# Patient Record
Sex: MALE | Race: WHITE | URBAN - METROPOLITAN AREA
[De-identification: names, ages, dates, MRNs, and addresses within clinical notes are randomized per-mention and may not be internally consistent; named-entity substitution may affect disease eponyms.]

---

## 2021-06-15 ENCOUNTER — OFFICE VISIT (OUTPATIENT)
Dept: OTOLARYNGOLOGY | Facility: CLINIC | Age: 26
End: 2021-06-15
Payer: COMMERCIAL

## 2021-06-15 VITALS
TEMPERATURE: 98 F | WEIGHT: 164 LBS | HEIGHT: 71 IN | SYSTOLIC BLOOD PRESSURE: 126 MMHG | BODY MASS INDEX: 22.96 KG/M2 | HEART RATE: 85 BPM | DIASTOLIC BLOOD PRESSURE: 74 MMHG

## 2021-06-15 DIAGNOSIS — H60.62 CHRONIC OTITIS EXTERNA OF LEFT EAR, UNSPECIFIED TYPE: Primary | ICD-10-CM

## 2021-06-15 PROCEDURE — 87076 CULTURE ANAEROBE IDENT EACH: CPT | Performed by: OTOLARYNGOLOGY

## 2021-06-15 PROCEDURE — 99203 OFFICE O/P NEW LOW 30 MIN: CPT | Performed by: OTOLARYNGOLOGY

## 2021-06-15 PROCEDURE — 87186 SC STD MICRODIL/AGAR DIL: CPT | Performed by: OTOLARYNGOLOGY

## 2021-06-15 PROCEDURE — 87070 CULTURE OTHR SPECIMN AEROBIC: CPT | Performed by: OTOLARYNGOLOGY

## 2021-06-15 PROCEDURE — 3008F BODY MASS INDEX DOCD: CPT | Performed by: OTOLARYNGOLOGY

## 2021-06-15 PROCEDURE — 3074F SYST BP LT 130 MM HG: CPT | Performed by: OTOLARYNGOLOGY

## 2021-06-15 PROCEDURE — 3078F DIAST BP <80 MM HG: CPT | Performed by: OTOLARYNGOLOGY

## 2021-06-15 PROCEDURE — 87077 CULTURE AEROBIC IDENTIFY: CPT | Performed by: OTOLARYNGOLOGY

## 2021-06-15 PROCEDURE — 92504 EAR MICROSCOPY EXAMINATION: CPT | Performed by: OTOLARYNGOLOGY

## 2021-06-15 PROCEDURE — 87205 SMEAR GRAM STAIN: CPT | Performed by: OTOLARYNGOLOGY

## 2021-06-15 RX ORDER — LISDEXAMFETAMINE DIMESYLATE 60 MG/1
CAPSULE ORAL
COMMUNITY
Start: 2020-12-23

## 2021-06-15 RX ORDER — CIPROFLOXACIN 500 MG/1
TABLET, FILM COATED ORAL
COMMUNITY
Start: 2021-03-09

## 2021-06-15 RX ORDER — IBUPROFEN 600 MG/1
600 TABLET ORAL
COMMUNITY
Start: 2019-09-20

## 2021-06-15 RX ORDER — CYCLOBENZAPRINE HCL 10 MG
10 TABLET ORAL
COMMUNITY
Start: 2019-09-20

## 2021-06-15 NOTE — PROGRESS NOTES
Malena Vaz is a 27year old male. Patient presents with:  Ear Problem: Dryness and flaky of ears. HPI:   For several years has had problems with irritation in his left ear. Is been flaky and scaly at times.   Sometimes there is discomfort and mild pa Detail Normal Submental. Submandibular. Anterior cervical. Posterior cervical. Supraclavicular.    Eyes Normal Conjunctiva - Right: Normal, Left: Normal. Pupil - Right: Normal, Left: Normal.    Ears Normal Inspection - Right: Normal, Left: Normal. Canal - L

## 2021-06-16 ENCOUNTER — TELEPHONE (OUTPATIENT)
Dept: OTOLARYNGOLOGY | Facility: CLINIC | Age: 26
End: 2021-06-16

## 2021-06-16 DIAGNOSIS — H60.62 CHRONIC OTITIS EXTERNA OF LEFT EAR, UNSPECIFIED TYPE: Primary | ICD-10-CM

## 2021-06-19 ENCOUNTER — TELEPHONE (OUTPATIENT)
Dept: OTOLARYNGOLOGY | Facility: CLINIC | Age: 26
End: 2021-06-19

## 2021-06-19 RX ORDER — CIPROFLOXACIN 500 MG/1
500 TABLET, FILM COATED ORAL EVERY 12 HOURS
Qty: 20 TABLET | Refills: 0 | Status: SHIPPED | OUTPATIENT
Start: 2021-06-19 | End: 2021-06-29

## 2021-11-21 ENCOUNTER — LAB SERVICES (OUTPATIENT)
Dept: LAB | Age: 26
End: 2021-11-21

## 2021-11-21 DIAGNOSIS — Z01.812 PRE-PROCEDURAL LABORATORY EXAMINATION: Primary | ICD-10-CM

## 2021-11-21 PROCEDURE — U0003 INFECTIOUS AGENT DETECTION BY NUCLEIC ACID (DNA OR RNA); SEVERE ACUTE RESPIRATORY SYNDROME CORONAVIRUS 2 (SARS-COV-2) (CORONAVIRUS DISEASE [COVID-19]), AMPLIFIED PROBE TECHNIQUE, MAKING USE OF HIGH THROUGHPUT TECHNOLOGIES AS DESCRIBED BY CMS-2020-01-R: HCPCS | Performed by: PSYCHIATRY & NEUROLOGY

## 2021-11-21 PROCEDURE — U0005 INFEC AGEN DETEC AMPLI PROBE: HCPCS | Performed by: PSYCHIATRY & NEUROLOGY

## 2021-11-22 LAB
SARS-COV-2 RNA RESP QL NAA+PROBE: NOT DETECTED
SERVICE CMNT-IMP: NORMAL
SERVICE CMNT-IMP: NORMAL

## 2021-11-22 RX ORDER — UBIDECARENONE 75 MG
50 CAPSULE ORAL DAILY
COMMUNITY

## 2021-11-22 ASSESSMENT — COGNITIVE AND FUNCTIONAL STATUS - GENERAL
ARE YOU BLIND OR DO YOU HAVE SERIOUS DIFFICULTY SEEING, EVEN WHEN WEARING GLASSES: NO
ARE YOU DEAF OR DO YOU HAVE SERIOUS DIFFICULTY  HEARING: NO

## 2021-11-22 ASSESSMENT — ACTIVITIES OF DAILY LIVING (ADL)
ADL_BEFORE_ADMISSION: INDEPENDENT
NEEDS_ASSIST: NO
HISTORY OF FALLING IN THE LAST YEAR (PRIOR TO ADMISSION): NO
ADL_SCORE: 12
ADL_SHORT_OF_BREATH: NO

## 2021-11-23 ENCOUNTER — ANESTHESIA EVENT (OUTPATIENT)
Dept: SURGERY | Age: 26
End: 2021-11-23

## 2021-11-23 ENCOUNTER — HOSPITAL ENCOUNTER (OUTPATIENT)
Age: 26
Discharge: HOME | End: 2021-11-23
Attending: ORTHOPAEDIC SURGERY | Admitting: ORTHOPAEDIC SURGERY

## 2021-11-23 ENCOUNTER — ANESTHESIA (OUTPATIENT)
Dept: SURGERY | Age: 26
End: 2021-11-23

## 2021-11-23 PROCEDURE — 10006027 HB SUPPLY 278: Performed by: ORTHOPAEDIC SURGERY

## 2021-11-23 PROCEDURE — 13000117 HB ORTHO COMPLEX CASE EA ADD MINUTE: Performed by: ORTHOPAEDIC SURGERY

## 2021-11-23 PROCEDURE — 13000001 HB PHASE II RECOVERY EA 30 MINUTES: Performed by: ORTHOPAEDIC SURGERY

## 2021-11-23 PROCEDURE — 10002801 HB RX 250 W/O HCPCS: Performed by: ORTHOPAEDIC SURGERY

## 2021-11-23 PROCEDURE — 10006023 HB SUPPLY 272: Performed by: ORTHOPAEDIC SURGERY

## 2021-11-23 PROCEDURE — 10002801 HB RX 250 W/O HCPCS: Performed by: ANESTHESIOLOGY

## 2021-11-23 PROCEDURE — C1713 ANCHOR/SCREW BN/BN,TIS/BN: HCPCS | Performed by: ORTHOPAEDIC SURGERY

## 2021-11-23 PROCEDURE — 10002800 HB RX 250 W HCPCS: Performed by: ORTHOPAEDIC SURGERY

## 2021-11-23 PROCEDURE — 10004452 HB PACU ADDL 30 MINUTES: Performed by: ORTHOPAEDIC SURGERY

## 2021-11-23 PROCEDURE — 13000002 HB ANESTHESIA  GENERAL  S/U + 1ST 15 MIN: Performed by: ORTHOPAEDIC SURGERY

## 2021-11-23 PROCEDURE — 10002800 HB RX 250 W HCPCS: Performed by: ANESTHESIOLOGY

## 2021-11-23 PROCEDURE — 13000116 HB ORTHO COMPLEX CASE S/U + 1ST 15 MIN: Performed by: ORTHOPAEDIC SURGERY

## 2021-11-23 PROCEDURE — 10004451 HB PACU RECOVERY 1ST 30 MINUTES: Performed by: ORTHOPAEDIC SURGERY

## 2021-11-23 PROCEDURE — 10002800 HB RX 250 W HCPCS: Performed by: PHYSICIAN ASSISTANT

## 2021-11-23 PROCEDURE — 10002807 HB RX 258: Performed by: ANESTHESIOLOGY

## 2021-11-23 PROCEDURE — 13000003 HB ANESTHESIA  GENERAL EA ADD MINUTE: Performed by: ORTHOPAEDIC SURGERY

## 2021-11-23 DEVICE — SUTR ANCH,BIO-COMP S-TAK
Type: IMPLANTABLE DEVICE | Site: SHOULDER | Status: FUNCTIONAL
Brand: ARTHREX®

## 2021-11-23 RX ORDER — 0.9 % SODIUM CHLORIDE 0.9 %
2 VIAL (ML) INJECTION EVERY 12 HOURS SCHEDULED
Status: DISCONTINUED | OUTPATIENT
Start: 2021-11-23 | End: 2021-11-23 | Stop reason: HOSPADM

## 2021-11-23 RX ORDER — ONDANSETRON 2 MG/ML
4 INJECTION INTRAMUSCULAR; INTRAVENOUS 2 TIMES DAILY PRN
Status: DISCONTINUED | OUTPATIENT
Start: 2021-11-23 | End: 2021-11-23 | Stop reason: HOSPADM

## 2021-11-23 RX ORDER — BUPIVACAINE HYDROCHLORIDE AND EPINEPHRINE 5; 5 MG/ML; UG/ML
INJECTION, SOLUTION EPIDURAL; INTRACAUDAL; PERINEURAL PRN
Status: DISCONTINUED | OUTPATIENT
Start: 2021-11-23 | End: 2021-11-23 | Stop reason: HOSPADM

## 2021-11-23 RX ORDER — MIDAZOLAM HYDROCHLORIDE 1 MG/ML
2 INJECTION, SOLUTION INTRAMUSCULAR; INTRAVENOUS
Status: COMPLETED | OUTPATIENT
Start: 2021-11-23 | End: 2021-11-23

## 2021-11-23 RX ORDER — DEXTROSE MONOHYDRATE 25 G/50ML
25 INJECTION, SOLUTION INTRAVENOUS PRN
Status: DISCONTINUED | OUTPATIENT
Start: 2021-11-23 | End: 2021-11-23 | Stop reason: HOSPADM

## 2021-11-23 RX ORDER — LIDOCAINE HYDROCHLORIDE 10 MG/ML
5-10 INJECTION, SOLUTION INFILTRATION; PERINEURAL PRN
Status: DISCONTINUED | OUTPATIENT
Start: 2021-11-23 | End: 2021-11-23 | Stop reason: HOSPADM

## 2021-11-23 RX ORDER — ONDANSETRON 2 MG/ML
INJECTION INTRAMUSCULAR; INTRAVENOUS PRN
Status: DISCONTINUED | OUTPATIENT
Start: 2021-11-23 | End: 2021-11-23

## 2021-11-23 RX ORDER — DEXAMETHASONE SODIUM PHOSPHATE 4 MG/ML
INJECTION, SOLUTION INTRA-ARTICULAR; INTRALESIONAL; INTRAMUSCULAR; INTRAVENOUS; SOFT TISSUE PRN
Status: DISCONTINUED | OUTPATIENT
Start: 2021-11-23 | End: 2021-11-23

## 2021-11-23 RX ORDER — NICOTINE POLACRILEX 4 MG
30 LOZENGE BUCCAL
Status: DISCONTINUED | OUTPATIENT
Start: 2021-11-23 | End: 2021-11-23 | Stop reason: HOSPADM

## 2021-11-23 RX ORDER — SODIUM CHLORIDE 9 MG/ML
INJECTION, SOLUTION INTRAVENOUS CONTINUOUS
Status: DISCONTINUED | OUTPATIENT
Start: 2021-11-23 | End: 2021-11-23 | Stop reason: HOSPADM

## 2021-11-23 RX ORDER — MAGNESIUM HYDROXIDE 1200 MG/15ML
LIQUID ORAL PRN
Status: DISCONTINUED | OUTPATIENT
Start: 2021-11-23 | End: 2021-11-23 | Stop reason: HOSPADM

## 2021-11-23 RX ORDER — LIDOCAINE HYDROCHLORIDE 10 MG/ML
INJECTION, SOLUTION INFILTRATION; PERINEURAL PRN
Status: DISCONTINUED | OUTPATIENT
Start: 2021-11-23 | End: 2021-11-23

## 2021-11-23 RX ORDER — GLYCOPYRROLATE 0.2 MG/ML
INJECTION, SOLUTION INTRAMUSCULAR; INTRAVENOUS PRN
Status: DISCONTINUED | OUTPATIENT
Start: 2021-11-23 | End: 2021-11-23

## 2021-11-23 RX ORDER — DEXTROSE MONOHYDRATE 50 MG/ML
INJECTION, SOLUTION INTRAVENOUS CONTINUOUS PRN
Status: DISCONTINUED | OUTPATIENT
Start: 2021-11-23 | End: 2021-11-23 | Stop reason: HOSPADM

## 2021-11-23 RX ORDER — HYDROCODONE BITARTRATE AND ACETAMINOPHEN 5; 325 MG/1; MG/1
2 TABLET ORAL EVERY 4 HOURS PRN
Status: DISCONTINUED | OUTPATIENT
Start: 2021-11-23 | End: 2021-11-23 | Stop reason: HOSPADM

## 2021-11-23 RX ORDER — HUMAN INSULIN 100 [IU]/ML
INJECTION, SOLUTION SUBCUTANEOUS
Status: DISCONTINUED | OUTPATIENT
Start: 2021-11-23 | End: 2021-11-23 | Stop reason: HOSPADM

## 2021-11-23 RX ORDER — SODIUM CHLORIDE, SODIUM LACTATE, POTASSIUM CHLORIDE, CALCIUM CHLORIDE 600; 310; 30; 20 MG/100ML; MG/100ML; MG/100ML; MG/100ML
INJECTION, SOLUTION INTRAVENOUS CONTINUOUS PRN
Status: DISCONTINUED | OUTPATIENT
Start: 2021-11-23 | End: 2021-11-23

## 2021-11-23 RX ORDER — NEOSTIGMINE METHYLSULFATE 4 MG/4 ML
SYRINGE (ML) INTRAVENOUS PRN
Status: DISCONTINUED | OUTPATIENT
Start: 2021-11-23 | End: 2021-11-23

## 2021-11-23 RX ORDER — ROCURONIUM BROMIDE 10 MG/ML
INJECTION, SOLUTION INTRAVENOUS PRN
Status: DISCONTINUED | OUTPATIENT
Start: 2021-11-23 | End: 2021-11-23

## 2021-11-23 RX ORDER — SODIUM CHLORIDE, SODIUM LACTATE, POTASSIUM CHLORIDE, CALCIUM CHLORIDE 600; 310; 30; 20 MG/100ML; MG/100ML; MG/100ML; MG/100ML
INJECTION, SOLUTION INTRAVENOUS CONTINUOUS
Status: DISCONTINUED | OUTPATIENT
Start: 2021-11-23 | End: 2021-11-23 | Stop reason: HOSPADM

## 2021-11-23 RX ORDER — PROPOFOL 10 MG/ML
INJECTION, EMULSION INTRAVENOUS PRN
Status: DISCONTINUED | OUTPATIENT
Start: 2021-11-23 | End: 2021-11-23

## 2021-11-23 RX ADMIN — LIDOCAINE HYDROCHLORIDE 50 MG: 10 INJECTION, SOLUTION INFILTRATION; PERINEURAL at 15:04

## 2021-11-23 RX ADMIN — ONDANSETRON 4 MG: 2 INJECTION INTRAMUSCULAR; INTRAVENOUS at 15:41

## 2021-11-23 RX ADMIN — Medication 15 MG: at 15:41

## 2021-11-23 RX ADMIN — DEXAMETHASONE SODIUM PHOSPHATE 8 MG: 4 INJECTION, SOLUTION INTRAMUSCULAR; INTRAVENOUS at 15:15

## 2021-11-23 RX ADMIN — GLYCOPYRROLATE 0.8 MG: 0.2 INJECTION, SOLUTION INTRAMUSCULAR; INTRAVENOUS at 16:14

## 2021-11-23 RX ADMIN — Medication 4 MG: at 16:14

## 2021-11-23 RX ADMIN — SODIUM CHLORIDE, POTASSIUM CHLORIDE, SODIUM LACTATE AND CALCIUM CHLORIDE: 600; 310; 30; 20 INJECTION, SOLUTION INTRAVENOUS at 14:56

## 2021-11-23 RX ADMIN — CEFAZOLIN SODIUM 2000 MG: 300 INJECTION, POWDER, LYOPHILIZED, FOR SOLUTION INTRAVENOUS at 15:11

## 2021-11-23 RX ADMIN — FENTANYL CITRATE 100 MCG: 50 INJECTION, SOLUTION INTRAMUSCULAR; INTRAVENOUS at 15:03

## 2021-11-23 RX ADMIN — PROPOFOL 25 MCG/KG/MIN: 10 INJECTION, EMULSION INTRAVENOUS at 15:34

## 2021-11-23 RX ADMIN — ROCURONIUM BROMIDE 40 MG: 10 INJECTION INTRAVENOUS at 15:04

## 2021-11-23 RX ADMIN — PROPOFOL 200 MG: 10 INJECTION, EMULSION INTRAVENOUS at 15:04

## 2021-11-23 RX ADMIN — MIDAZOLAM 2 MG: 1 INJECTION INTRAMUSCULAR; INTRAVENOUS at 14:52

## 2021-11-23 ASSESSMENT — PAIN SCALES - GENERAL
PAINLEVEL_OUTOF10: 6
PAINLEVEL_OUTOF10: 0
PAINLEVEL_OUTOF10: 6
PAINLEVEL_OUTOF10: 5
PAINLEVEL_OUTOF10: 0

## 2021-11-24 VITALS
WEIGHT: 169.09 LBS | HEIGHT: 71 IN | TEMPERATURE: 96.8 F | RESPIRATION RATE: 18 BRPM | OXYGEN SATURATION: 99 % | HEART RATE: 87 BPM | BODY MASS INDEX: 23.67 KG/M2 | SYSTOLIC BLOOD PRESSURE: 140 MMHG | DIASTOLIC BLOOD PRESSURE: 70 MMHG

## 2022-06-11 ENCOUNTER — HOSPITAL ENCOUNTER (OUTPATIENT)
Age: 27
Discharge: HOME | End: 2022-06-11
Payer: COMMERCIAL

## 2022-06-11 LAB — SARS-COV-2 RNA RESP QL NAA+PROBE: DETECTED

## 2022-06-12 ENCOUNTER — HOSPITAL ENCOUNTER (EMERGENCY)
Facility: HOSPITAL | Age: 27
Discharge: HOME | End: 2022-06-12
Attending: STUDENT IN AN ORGANIZED HEALTH CARE EDUCATION/TRAINING PROGRAM
Payer: COMMERCIAL

## 2022-06-12 ENCOUNTER — APPOINTMENT (OUTPATIENT)
Dept: CT IMAGING | Facility: HOSPITAL | Age: 27
End: 2022-06-12
Attending: STUDENT IN AN ORGANIZED HEALTH CARE EDUCATION/TRAINING PROGRAM
Payer: COMMERCIAL

## 2022-06-12 VITALS
RESPIRATION RATE: 18 BRPM | TEMPERATURE: 98 F | DIASTOLIC BLOOD PRESSURE: 54 MMHG | HEIGHT: 70 IN | BODY MASS INDEX: 24.91 KG/M2 | SYSTOLIC BLOOD PRESSURE: 136 MMHG | HEART RATE: 73 BPM | WEIGHT: 174 LBS | OXYGEN SATURATION: 97 %

## 2022-06-12 DIAGNOSIS — K52.9 ACUTE COLITIS: Primary | ICD-10-CM

## 2022-06-12 LAB
ANION GAP SERPL CALC-SCNC: 5 MMOL/L (ref 0–18)
BASOPHILS # BLD AUTO: 0.04 X10(3) UL (ref 0–0.2)
BASOPHILS NFR BLD AUTO: 0.6 %
BUN BLD-MCNC: 9 MG/DL (ref 7–18)
BUN/CREAT SERPL: 9.3 (ref 10–20)
CALCIUM BLD-MCNC: 9.2 MG/DL (ref 8.5–10.1)
CHLORIDE SERPL-SCNC: 106 MMOL/L (ref 98–112)
CO2 SERPL-SCNC: 30 MMOL/L (ref 21–32)
CREAT BLD-MCNC: 0.97 MG/DL
CRP SERPL-MCNC: <0.29 MG/DL (ref ?–0.3)
DEPRECATED RDW RBC AUTO: 46.1 FL (ref 35.1–46.3)
EOSINOPHIL # BLD AUTO: 0.04 X10(3) UL (ref 0–0.7)
EOSINOPHIL NFR BLD AUTO: 0.6 %
ERYTHROCYTE [DISTWIDTH] IN BLOOD BY AUTOMATED COUNT: 13.2 % (ref 11–15)
ERYTHROCYTE [SEDIMENTATION RATE] IN BLOOD: 3 MM/HR
GLUCOSE BLD-MCNC: 89 MG/DL (ref 70–99)
HCT VFR BLD AUTO: 49.1 %
HGB BLD-MCNC: 16.2 G/DL
IMM GRANULOCYTES # BLD AUTO: 0.01 X10(3) UL (ref 0–1)
IMM GRANULOCYTES NFR BLD: 0.2 %
LYMPHOCYTES # BLD AUTO: 1.41 X10(3) UL (ref 1–4)
LYMPHOCYTES NFR BLD AUTO: 22.3 %
MCH RBC QN AUTO: 31 PG (ref 26–34)
MCHC RBC AUTO-ENTMCNC: 33 G/DL (ref 31–37)
MCV RBC AUTO: 94.1 FL
MONOCYTES # BLD AUTO: 0.91 X10(3) UL (ref 0.1–1)
MONOCYTES NFR BLD AUTO: 14.4 %
NEUTROPHILS # BLD AUTO: 3.92 X10 (3) UL (ref 1.5–7.7)
NEUTROPHILS # BLD AUTO: 3.92 X10(3) UL (ref 1.5–7.7)
NEUTROPHILS NFR BLD AUTO: 61.9 %
OSMOLALITY SERPL CALC.SUM OF ELEC: 290 MOSM/KG (ref 275–295)
PLATELET # BLD AUTO: 311 10(3)UL (ref 150–450)
POTASSIUM SERPL-SCNC: 4.3 MMOL/L (ref 3.5–5.1)
RBC # BLD AUTO: 5.22 X10(6)UL
SODIUM SERPL-SCNC: 141 MMOL/L (ref 136–145)
WBC # BLD AUTO: 6.3 X10(3) UL (ref 4–11)

## 2022-06-12 PROCEDURE — 36415 COLL VENOUS BLD VENIPUNCTURE: CPT

## 2022-06-12 PROCEDURE — 74177 CT ABD & PELVIS W/CONTRAST: CPT | Performed by: STUDENT IN AN ORGANIZED HEALTH CARE EDUCATION/TRAINING PROGRAM

## 2022-06-12 PROCEDURE — 85025 COMPLETE CBC W/AUTO DIFF WBC: CPT | Performed by: STUDENT IN AN ORGANIZED HEALTH CARE EDUCATION/TRAINING PROGRAM

## 2022-06-12 PROCEDURE — 85652 RBC SED RATE AUTOMATED: CPT | Performed by: STUDENT IN AN ORGANIZED HEALTH CARE EDUCATION/TRAINING PROGRAM

## 2022-06-12 PROCEDURE — 80048 BASIC METABOLIC PNL TOTAL CA: CPT | Performed by: STUDENT IN AN ORGANIZED HEALTH CARE EDUCATION/TRAINING PROGRAM

## 2022-06-12 PROCEDURE — 86140 C-REACTIVE PROTEIN: CPT | Performed by: STUDENT IN AN ORGANIZED HEALTH CARE EDUCATION/TRAINING PROGRAM

## 2022-06-12 PROCEDURE — 99284 EMERGENCY DEPT VISIT MOD MDM: CPT

## 2022-06-12 RX ORDER — AMOXICILLIN AND CLAVULANATE POTASSIUM 875; 125 MG/1; MG/1
1 TABLET, FILM COATED ORAL 2 TIMES DAILY
Qty: 14 TABLET | Refills: 0 | Status: SHIPPED | OUTPATIENT
Start: 2022-06-12 | End: 2022-06-19

## 2022-06-13 ENCOUNTER — TELEPHONE (OUTPATIENT)
Dept: GASTROENTEROLOGY | Facility: CLINIC | Age: 27
End: 2022-06-13

## 2022-06-13 ENCOUNTER — TELEPHONE (OUTPATIENT)
Dept: INTERNAL MEDICINE CLINIC | Facility: CLINIC | Age: 27
End: 2022-06-13

## 2022-06-13 NOTE — TELEPHONE ENCOUNTER
Pt's mother, Maribel Trotter, called with concerns after ER visit and GI follow up/testing that is needed   - Advised lower GI cannot be scheduled until Sept/or Oct     Pt was seen in the ER yesterday for bleeding after bowel movements   Pt had a CT scan, abdomen was irritated, could be colitis   He is also Covid positive     - new pt ER follow up scheduled with Dr Atilio Garcia on June 21       Patient can be reached at [phone]

## 2022-06-14 NOTE — TELEPHONE ENCOUNTER
pts mother is calling to f/up on sched an appt for the pt to be seen for ER F/up. Mom states that the pt is with her.

## 2022-06-14 NOTE — TELEPHONE ENCOUNTER
I spoke to the pt's mother. We scheduled a f/u appt for today.     Date time and OP location verified    Your Appointments    Tuesday June 14, 2022  1:30 PM  Consult with Alpesh Gan MD  [facility], [facility], [facility], [facility] ([address]) [address]  [phone]

## 2022-06-21 ENCOUNTER — OFFICE VISIT (OUTPATIENT)
Dept: INTERNAL MEDICINE CLINIC | Facility: CLINIC | Age: 27
End: 2022-06-21
Payer: COMMERCIAL

## 2022-06-21 ENCOUNTER — LAB ENCOUNTER (OUTPATIENT)
Dept: LAB | Age: 27
End: 2022-06-21
Attending: INTERNAL MEDICINE
Payer: COMMERCIAL

## 2022-06-21 ENCOUNTER — OFFICE VISIT (OUTPATIENT)
Dept: GASTROENTEROLOGY | Facility: CLINIC | Age: 27
End: 2022-06-21
Payer: COMMERCIAL

## 2022-06-21 VITALS
DIASTOLIC BLOOD PRESSURE: 72 MMHG | TEMPERATURE: 98 F | HEART RATE: 84 BPM | WEIGHT: 170 LBS | OXYGEN SATURATION: 98 % | HEIGHT: 70 IN | BODY MASS INDEX: 24.34 KG/M2 | SYSTOLIC BLOOD PRESSURE: 110 MMHG

## 2022-06-21 VITALS
HEIGHT: 70 IN | SYSTOLIC BLOOD PRESSURE: 115 MMHG | WEIGHT: 169 LBS | DIASTOLIC BLOOD PRESSURE: 68 MMHG | HEART RATE: 78 BPM | BODY MASS INDEX: 24.2 KG/M2

## 2022-06-21 DIAGNOSIS — A09 INFECTIOUS COLITIS: ICD-10-CM

## 2022-06-21 DIAGNOSIS — R10.84 GENERALIZED ABDOMINAL PAIN: ICD-10-CM

## 2022-06-21 DIAGNOSIS — Z00.00 PHYSICAL EXAM: ICD-10-CM

## 2022-06-21 DIAGNOSIS — U07.1 COVID-19 VIRUS INFECTION: ICD-10-CM

## 2022-06-21 DIAGNOSIS — R93.3 ABNORMAL CT SCAN, COLON: Primary | ICD-10-CM

## 2022-06-21 DIAGNOSIS — Z00.00 PHYSICAL EXAM: Primary | ICD-10-CM

## 2022-06-21 DIAGNOSIS — K92.1 HEMATOCHEZIA: ICD-10-CM

## 2022-06-21 DIAGNOSIS — K62.5 RECTAL BLEED: ICD-10-CM

## 2022-06-21 DIAGNOSIS — J30.2 SEASONAL ALLERGIES: ICD-10-CM

## 2022-06-21 LAB
ALBUMIN SERPL-MCNC: 4.1 G/DL (ref 3.4–5)
ALBUMIN/GLOB SERPL: 1.2 {RATIO} (ref 1–2)
ALP LIVER SERPL-CCNC: 75 U/L
ALT SERPL-CCNC: 31 U/L
ANION GAP SERPL CALC-SCNC: 5 MMOL/L (ref 0–18)
AST SERPL-CCNC: 15 U/L (ref 15–37)
BASOPHILS # BLD AUTO: 0.05 X10(3) UL (ref 0–0.2)
BASOPHILS NFR BLD AUTO: 0.8 %
BILIRUB SERPL-MCNC: 0.5 MG/DL (ref 0.1–2)
BUN BLD-MCNC: 13 MG/DL (ref 7–18)
BUN/CREAT SERPL: 12.9 (ref 10–20)
CALCIUM BLD-MCNC: 8.9 MG/DL (ref 8.5–10.1)
CHLORIDE SERPL-SCNC: 105 MMOL/L (ref 98–112)
CHOLEST SERPL-MCNC: 143 MG/DL (ref ?–200)
CO2 SERPL-SCNC: 30 MMOL/L (ref 21–32)
CREAT BLD-MCNC: 1.01 MG/DL
DEPRECATED RDW RBC AUTO: 46.3 FL (ref 35.1–46.3)
EOSINOPHIL # BLD AUTO: 0.06 X10(3) UL (ref 0–0.7)
EOSINOPHIL NFR BLD AUTO: 1 %
ERYTHROCYTE [DISTWIDTH] IN BLOOD BY AUTOMATED COUNT: 13.1 % (ref 11–15)
EST. AVERAGE GLUCOSE BLD GHB EST-MCNC: 105 MG/DL (ref 68–126)
FASTING PATIENT LIPID ANSWER: NO
FASTING STATUS PATIENT QL REPORTED: NO
GLOBULIN PLAS-MCNC: 3.4 G/DL (ref 2.8–4.4)
GLUCOSE BLD-MCNC: 79 MG/DL (ref 70–99)
HBA1C MFR BLD: 5.3 % (ref ?–5.7)
HCT VFR BLD AUTO: 48 %
HDLC SERPL-MCNC: 30 MG/DL (ref 40–59)
HGB BLD-MCNC: 15.5 G/DL
IMM GRANULOCYTES # BLD AUTO: 0.01 X10(3) UL (ref 0–1)
IMM GRANULOCYTES NFR BLD: 0.2 %
LDLC SERPL CALC-MCNC: 82 MG/DL (ref ?–100)
LYMPHOCYTES # BLD AUTO: 2.18 X10(3) UL (ref 1–4)
LYMPHOCYTES NFR BLD AUTO: 35.7 %
MCH RBC QN AUTO: 30.9 PG (ref 26–34)
MCHC RBC AUTO-ENTMCNC: 32.3 G/DL (ref 31–37)
MCV RBC AUTO: 95.8 FL
MONOCYTES # BLD AUTO: 0.48 X10(3) UL (ref 0.1–1)
MONOCYTES NFR BLD AUTO: 7.9 %
NEUTROPHILS # BLD AUTO: 3.32 X10 (3) UL (ref 1.5–7.7)
NEUTROPHILS # BLD AUTO: 3.32 X10(3) UL (ref 1.5–7.7)
NEUTROPHILS NFR BLD AUTO: 54.4 %
NONHDLC SERPL-MCNC: 113 MG/DL (ref ?–130)
OSMOLALITY SERPL CALC.SUM OF ELEC: 289 MOSM/KG (ref 275–295)
PLATELET # BLD AUTO: 376 10(3)UL (ref 150–450)
POTASSIUM SERPL-SCNC: 3.9 MMOL/L (ref 3.5–5.1)
PROT SERPL-MCNC: 7.5 G/DL (ref 6.4–8.2)
RBC # BLD AUTO: 5.01 X10(6)UL
SODIUM SERPL-SCNC: 140 MMOL/L (ref 136–145)
TRIGL SERPL-MCNC: 177 MG/DL (ref 30–149)
TSI SER-ACNC: 0.62 MIU/ML (ref 0.36–3.74)
VLDLC SERPL CALC-MCNC: 28 MG/DL (ref 0–30)
WBC # BLD AUTO: 6.1 X10(3) UL (ref 4–11)

## 2022-06-21 PROCEDURE — 85025 COMPLETE CBC W/AUTO DIFF WBC: CPT

## 2022-06-21 PROCEDURE — 84443 ASSAY THYROID STIM HORMONE: CPT

## 2022-06-21 PROCEDURE — 3074F SYST BP LT 130 MM HG: CPT | Performed by: INTERNAL MEDICINE

## 2022-06-21 PROCEDURE — 3008F BODY MASS INDEX DOCD: CPT | Performed by: INTERNAL MEDICINE

## 2022-06-21 PROCEDURE — 83036 HEMOGLOBIN GLYCOSYLATED A1C: CPT

## 2022-06-21 PROCEDURE — 99204 OFFICE O/P NEW MOD 45 MIN: CPT | Performed by: INTERNAL MEDICINE

## 2022-06-21 PROCEDURE — 80061 LIPID PANEL: CPT

## 2022-06-21 PROCEDURE — 80053 COMPREHEN METABOLIC PANEL: CPT

## 2022-06-21 PROCEDURE — 3078F DIAST BP <80 MM HG: CPT | Performed by: INTERNAL MEDICINE

## 2022-06-21 PROCEDURE — 99385 PREV VISIT NEW AGE 18-39: CPT | Performed by: INTERNAL MEDICINE

## 2022-06-21 PROCEDURE — 36415 COLL VENOUS BLD VENIPUNCTURE: CPT

## 2022-06-21 RX ORDER — MULTIVIT-MIN/IRON/FOLIC ACID/K 18-600-40
CAPSULE ORAL
COMMUNITY

## 2022-06-21 RX ORDER — TESTOSTERONE CYPIONATE 200 MG/ML
INJECTION INTRAMUSCULAR
COMMUNITY
Start: 2022-05-31

## 2022-06-21 RX ORDER — MOMETASONE FUROATE 1 MG/G
OINTMENT TOPICAL
COMMUNITY
Start: 2022-02-28

## 2022-06-21 RX ORDER — ANASTROZOLE 1 MG/1
TABLET ORAL
COMMUNITY
Start: 2022-05-07

## 2022-06-21 NOTE — PROGRESS NOTES
NICOLE completed and faxed to pts previous primary Dr. Shaji Arcos @ [phone], conformation received. Original sent to scan.

## 2022-12-30 ENCOUNTER — TELEPHONE (OUTPATIENT)
Dept: INTERNAL MEDICINE CLINIC | Facility: CLINIC | Age: 27
End: 2022-12-30

## 2022-12-30 NOTE — TELEPHONE ENCOUNTER
Was on a call with patient's mother Katelin Patience regarding patient's father, Ellsworth Baumgarten and risk for aneurysm. Katelin Patience asking if Uzair Sanders should be screened for aneurysm too. Patient's grandfather and cousin (age 35) + for brain aneurysm.      To Dr. Matilde Agudelo to please advise--

## 2023-01-03 NOTE — TELEPHONE ENCOUNTER
Would wait first to see fathers MRA--if no aneurysm seen, then sons most likely do not need to be screened for aneurysm.

## 2023-01-03 NOTE — TELEPHONE ENCOUNTER
Pasquale Springer called and relayed Dr Ryan Duong message. Pasquale Springer verbalized understanding with no further questions noted.

## 2023-01-03 NOTE — TELEPHONE ENCOUNTER
Patient's mother Garrett Houston is checking on status of call back from Dr. Kavya Contreras.     Best call back number for Garrett  [phone]

## 2023-03-20 ENCOUNTER — OFFICE VISIT (OUTPATIENT)
Dept: INTERNAL MEDICINE CLINIC | Facility: CLINIC | Age: 28
End: 2023-03-20

## 2023-03-20 VITALS
TEMPERATURE: 98 F | OXYGEN SATURATION: 98 % | RESPIRATION RATE: 16 BRPM | HEIGHT: 70 IN | BODY MASS INDEX: 25.34 KG/M2 | SYSTOLIC BLOOD PRESSURE: 112 MMHG | DIASTOLIC BLOOD PRESSURE: 72 MMHG | HEART RATE: 78 BPM | WEIGHT: 177 LBS

## 2023-03-20 DIAGNOSIS — R07.9 CHEST PAIN, UNSPECIFIED TYPE: Primary | ICD-10-CM

## 2023-03-20 PROCEDURE — 3008F BODY MASS INDEX DOCD: CPT | Performed by: INTERNAL MEDICINE

## 2023-03-20 PROCEDURE — 3074F SYST BP LT 130 MM HG: CPT | Performed by: INTERNAL MEDICINE

## 2023-03-20 PROCEDURE — 3078F DIAST BP <80 MM HG: CPT | Performed by: INTERNAL MEDICINE

## 2023-03-20 PROCEDURE — 99214 OFFICE O/P EST MOD 30 MIN: CPT | Performed by: INTERNAL MEDICINE

## 2023-03-20 RX ORDER — PANTOPRAZOLE SODIUM 40 MG/1
40 TABLET, DELAYED RELEASE ORAL
Qty: 30 TABLET | Refills: 1 | Status: SHIPPED | OUTPATIENT
Start: 2023-03-20

## 2023-03-21 ENCOUNTER — TELEPHONE (OUTPATIENT)
Dept: INTERNAL MEDICINE CLINIC | Facility: CLINIC | Age: 28
End: 2023-03-21

## 2023-03-29 ENCOUNTER — HOSPITAL ENCOUNTER (OUTPATIENT)
Dept: CT IMAGING | Facility: HOSPITAL | Age: 28
Discharge: HOME | End: 2023-03-29
Attending: INTERNAL MEDICINE
Payer: COMMERCIAL

## 2023-03-29 DIAGNOSIS — R07.9 CHEST PAIN, UNSPECIFIED TYPE: ICD-10-CM

## 2023-03-29 PROCEDURE — 71250 CT THORAX DX C-: CPT | Performed by: INTERNAL MEDICINE

## 2023-04-06 ENCOUNTER — HOSPITAL ENCOUNTER (OUTPATIENT)
Dept: CV DIAGNOSTICS | Facility: HOSPITAL | Age: 28
Discharge: HOME | End: 2023-04-06
Attending: INTERNAL MEDICINE
Payer: COMMERCIAL

## 2023-04-06 DIAGNOSIS — R07.9 CHEST PAIN, UNSPECIFIED TYPE: ICD-10-CM

## 2023-04-06 PROCEDURE — 93306 TTE W/DOPPLER COMPLETE: CPT | Performed by: INTERNAL MEDICINE

## 2023-05-28 ENCOUNTER — OFFICE VISIT (OUTPATIENT)
Dept: FAMILY MEDICINE CLINIC | Facility: CLINIC | Age: 28
End: 2023-05-28
Payer: COMMERCIAL

## 2023-05-28 VITALS
HEART RATE: 91 BPM | WEIGHT: 181.38 LBS | TEMPERATURE: 98 F | BODY MASS INDEX: 25.97 KG/M2 | SYSTOLIC BLOOD PRESSURE: 132 MMHG | HEIGHT: 70 IN | RESPIRATION RATE: 18 BRPM | OXYGEN SATURATION: 95 % | DIASTOLIC BLOOD PRESSURE: 50 MMHG

## 2023-05-28 DIAGNOSIS — R50.9 FEVER, UNSPECIFIED FEVER CAUSE: Primary | ICD-10-CM

## 2023-05-28 DIAGNOSIS — J02.9 SORE THROAT: ICD-10-CM

## 2023-05-28 PROBLEM — IMO0002 CLOSED FRACTURE OF PROXIMAL PHALANX OF TOE: Status: ACTIVE | Noted: 2018-10-29

## 2023-05-28 PROBLEM — S46.919A STRAIN OF SHOULDER: Status: ACTIVE | Noted: 2019-09-20

## 2023-05-28 LAB
CONTROL LINE PRESENT WITH A CLEAR BACKGROUND (YES/NO): YES YES/NO
KIT LOT #: NORMAL NUMERIC
OPERATOR ID: NORMAL
RAPID SARS-COV-2 BY PCR: NOT DETECTED
STREP GRP A CUL-SCR: NEGATIVE

## 2023-05-28 RX ORDER — FINASTERIDE 5 MG/1
TABLET, FILM COATED ORAL
COMMUNITY
Start: 2023-03-01

## 2023-05-28 NOTE — PATIENT INSTRUCTIONS
Tylenol and Motrin as needed  Rest, push fluids  Mucinex DM over the counter for nasal congestion/cough  START daily antihistamine (Zyrtec, Claritin, Allegra) and choose one of those. Take daily for 1-2 weeks to help with any post nasal drainage/sore throat  START Flonase nasal spray daily.  1 spray in each nostril  Return to care for new/worsening symptoms or if symptoms persist for 2+ weeks without any improvement

## 2023-05-30 ENCOUNTER — OFFICE VISIT (OUTPATIENT)
Dept: OTOLARYNGOLOGY | Facility: CLINIC | Age: 28
End: 2023-05-30

## 2023-05-30 VITALS — HEIGHT: 70 IN | BODY MASS INDEX: 25.91 KG/M2 | WEIGHT: 181 LBS

## 2023-05-30 DIAGNOSIS — J35.8 ASYMMETRIC TONSILS: ICD-10-CM

## 2023-05-30 DIAGNOSIS — J03.80 ACUTE TONSILLITIS DUE TO OTHER SPECIFIED ORGANISMS: ICD-10-CM

## 2023-05-30 DIAGNOSIS — J32.9 PURULENT POSTNASAL DRAINAGE: ICD-10-CM

## 2023-05-30 DIAGNOSIS — H65.01 RIGHT ACUTE SEROUS OTITIS MEDIA, RECURRENCE NOT SPECIFIED: Primary | ICD-10-CM

## 2023-05-30 PROCEDURE — 99243 OFF/OP CNSLTJ NEW/EST LOW 30: CPT | Performed by: SPECIALIST

## 2023-05-30 PROCEDURE — 3008F BODY MASS INDEX DOCD: CPT | Performed by: SPECIALIST

## 2023-05-30 RX ORDER — PREDNISONE 10 MG/1
10 TABLET ORAL DAILY
Qty: 3 TABLET | Refills: 0 | Status: SHIPPED | OUTPATIENT
Start: 2023-05-30

## 2023-05-30 RX ORDER — AMOXICILLIN AND CLAVULANATE POTASSIUM 875; 125 MG/1; MG/1
1 TABLET, FILM COATED ORAL EVERY 12 HOURS
Qty: 28 TABLET | Refills: 0 | Status: SHIPPED | OUTPATIENT
Start: 2023-05-30

## 2023-05-30 NOTE — PATIENT INSTRUCTIONS
You have asymmetric tonsils right greater than left for evolving peritonsillar abscess. He also had a right-sided [place]ks media and purulent postnasal drainage. I placed you on 2 weeks of Augmentin and 3 days of present. Follow-up in 1 week's time, sooner if problems.

## 2023-06-22 ENCOUNTER — OFFICE VISIT (OUTPATIENT)
Dept: OTOLARYNGOLOGY | Facility: CLINIC | Age: 28
End: 2023-06-22

## 2023-06-22 VITALS — HEIGHT: 70 IN | WEIGHT: 181 LBS | BODY MASS INDEX: 25.91 KG/M2

## 2023-06-22 DIAGNOSIS — J32.9 PURULENT POSTNASAL DRAINAGE: ICD-10-CM

## 2023-06-22 DIAGNOSIS — J35.8 ASYMMETRIC TONSILS: Primary | ICD-10-CM

## 2023-06-22 PROCEDURE — 3008F BODY MASS INDEX DOCD: CPT | Performed by: SPECIALIST

## 2023-06-22 PROCEDURE — 99213 OFFICE O/P EST LOW 20 MIN: CPT | Performed by: SPECIALIST

## 2023-06-22 RX ORDER — AZITHROMYCIN 250 MG/1
TABLET, FILM COATED ORAL
Qty: 11 TABLET | Refills: 0 | Status: SHIPPED | OUTPATIENT
Start: 2023-06-22

## 2023-06-22 NOTE — PATIENT INSTRUCTIONS
You have markedly asymmetric tonsils right greater than left. You also have persistent purulent postnasal drainage. I placed you on a trial of azithromycin. Follow-up in 3 weeks time, sooner if problems.

## 2023-06-23 ENCOUNTER — TELEPHONE (OUTPATIENT)
Dept: OTOLARYNGOLOGY | Facility: CLINIC | Age: 28
End: 2023-06-23

## 2023-06-23 NOTE — TELEPHONE ENCOUNTER
[facility] calling to speak with nurse to confirm quantity and instructions for rx Z-pack. Please call at [phone],GAURAV.

## 2024-01-08 ENCOUNTER — TELEPHONE (OUTPATIENT)
Dept: OTOLARYNGOLOGY | Facility: CLINIC | Age: 29
End: 2024-01-08

## 2024-01-08 NOTE — TELEPHONE ENCOUNTER
, pt has tested twice for Covid and negative. Per pt on Saturday had body aches and chills, pt did not take temp.per p Per pt has cough-productive yellow/green in color, sinus congestion, sore throat. Per pt requesting antibiotic. Please advise

## 2024-01-09 ENCOUNTER — OFFICE VISIT (OUTPATIENT)
Dept: OTOLARYNGOLOGY | Facility: CLINIC | Age: 29
End: 2024-01-09
Payer: COMMERCIAL

## 2024-01-09 VITALS — HEIGHT: 70 IN | BODY MASS INDEX: 26.05 KG/M2 | WEIGHT: 182 LBS

## 2024-01-09 DIAGNOSIS — H65.02 ACUTE SEROUS OTITIS MEDIA OF LEFT EAR, RECURRENCE NOT SPECIFIED: ICD-10-CM

## 2024-01-09 DIAGNOSIS — J35.8 ASYMMETRIC TONSILS: Primary | ICD-10-CM

## 2024-01-09 DIAGNOSIS — J01.90 ACUTE BACTERIAL SINUSITIS: ICD-10-CM

## 2024-01-09 DIAGNOSIS — B96.89 ACUTE BACTERIAL SINUSITIS: ICD-10-CM

## 2024-01-09 DIAGNOSIS — Q35.7 BIFID UVULA: ICD-10-CM

## 2024-01-09 PROCEDURE — 99213 OFFICE O/P EST LOW 20 MIN: CPT | Performed by: SPECIALIST

## 2024-01-09 PROCEDURE — 3008F BODY MASS INDEX DOCD: CPT | Performed by: SPECIALIST

## 2024-01-09 RX ORDER — AMOXICILLIN AND CLAVULANATE POTASSIUM 875; 125 MG/1; MG/1
1 TABLET, FILM COATED ORAL EVERY 12 HOURS
Qty: 28 TABLET | Refills: 0 | Status: SHIPPED | OUTPATIENT
Start: 2024-01-09

## 2024-01-09 RX ORDER — PREDNISONE 10 MG/1
10 TABLET ORAL DAILY
Qty: 3 TABLET | Refills: 0 | Status: SHIPPED | OUTPATIENT
Start: 2024-01-09

## 2024-01-09 NOTE — PROGRESS NOTES
Shane Nails is a 28 year old male.   Chief Complaint   Patient presents with    Sinus Problem     Sinus pressure    Sore Throat     Sore throat     Swallowing Problem     Hurts to swallow    Ear Problem     Pulsing sensation in ears     HPI:   Patient here has felt sick for the past week or so    Current Outpatient Medications   Medication Sig Dispense Refill    amoxicillin clavulanate 875-125 MG Oral Tab Take 1 tablet by mouth every 12 (twelve) hours. 28 tablet 0    predniSONE 10 MG Oral Tab Take 1 tablet (10 mg total) by mouth daily. 3 tablet 0    finasteride 5 MG Oral Tab       pantoprazole (PROTONIX) 40 MG Oral Tab EC Take 1 tablet (40 mg total) by mouth every morning before breakfast. 30 tablet 1    anastrozole 1 MG Oral Tab tab       testosterone cypionate 200 MG/ML Intramuscular Solution       azithromycin (ZITHROMAX Z-PAULA) 250 MG Oral Tab Take 1 by oral route every day for 10 days. 2 tablets today. (Patient not taking: Reported on 1/9/2024) 11 tablet 0    amoxicillin clavulanate 875-125 MG Oral Tab Take 1 tablet by mouth every 12 (twelve) hours. (Patient not taking: Reported on 6/22/2023) 28 tablet 0    predniSONE 10 MG Oral Tab Take 1 tablet (10 mg total) by mouth daily. (Patient not taking: Reported on 6/22/2023) 3 tablet 0      Past Medical History:   Diagnosis Date    Lung nodules     Seasonal allergies       Social History:  Social History     Socioeconomic History    Marital status: Single   Tobacco Use    Smoking status: Never    Smokeless tobacco: Never   Vaping Use    Vaping Use: Every day    Start date: 1/1/2016    Substances: Nicotine    Devices: Disposable   Substance and Sexual Activity    Alcohol use: Yes     Alcohol/week: 1.0 standard drink of alcohol     Types: 1 Shots of liquor per week     Comment: Occasional        REVIEW OF SYSTEMS:   GENERAL HEALTH: feels well otherwise  GENERAL : denies fever, chills, sweats, weight loss, weight gain  SKIN: denies any unusual skin lesions or  rashes  RESPIRATORY: denies shortness of breath with exertion  NEURO: denies headaches    EXAM:   Ht 5' 10\" (1.778 m)   Wt 182 lb (82.6 kg)   BMI 26.11 kg/m²   System Details   Skin Inspection - Normal.   Constitutional Overall appearance - Normal.   Head/Face Facial features - Normal. Eyebrows - Normal. Skull - Normal.   Eyes Conjunctiva - Right: Normal, Left: Normal. Pupil - Right: Normal, Left: Normal.    Ears Inspection - Right: Normal, Left: Normal.   Canal - Right: Normal, Left: Normal.   TM - Right: Normal, Left: Acute left serous otitis media   Nasal External nose - Normal.   Nasal septum - Normal.  Turbinates -nasal purulence left greater than right   Oral/Oropharynx Lips - Normal, Tonsils -asymmetric tonsils right greater than left tongue - Normal.  Bifid uvula   Neck Exam Inspection - Normal. Palpation - Normal. Parotid gland - Normal. Thyroid gland - Normal.   Lymph Detail Submental. Submandibular. Anterior cervical. Posterior cervical. Supraclavicular all without enlargement   Psychiatric Orientation - Oriented to time, place, person & situation. Appropriate mood and affect.   Neurological Memory - Normal. Cranial nerves - Cranial nerves II through XII grossly intact.     ASSESSMENT AND PLAN:   1. Asymmetric tonsils  No change since last visit 6/22/2023.  Right greater than left tonsils.    2. Acute serous otitis media of left ear, recurrence not specified  On the left.  Patient placed on Augmentin and 3 days of prednisone.    3. Acute bacterial sinusitis  On the left.  Patient placed on Augmentin and 3 days of prednisone.  Follow-up in 3 weeks time, sooner if problems.    4. Bifid uvula  Incidental finding.      The patient indicates understanding of these issues and agrees to the plan.      Shakila Glass MD  1/9/2024  12:56 PM

## 2024-01-09 NOTE — PATIENT INSTRUCTIONS
You were placed on a trial of Augmentin and 3 days of prednisone for your left serous otitis media and acute sinusitis.  Follow-up in 3 weeks time, sooner if problems.  You have persistent asymmetric tonsils right greater than left which have not changed since her last visit on 6/22/2023.  You have an incidental finding of a bifid uvula.

## 2024-01-12 ENCOUNTER — OFFICE VISIT (OUTPATIENT)
Dept: OTOLARYNGOLOGY | Facility: CLINIC | Age: 29
End: 2024-01-12

## 2024-01-12 VITALS — HEIGHT: 70 IN | BODY MASS INDEX: 26.05 KG/M2 | WEIGHT: 182 LBS

## 2024-01-12 DIAGNOSIS — H65.02 ACUTE SEROUS OTITIS MEDIA OF LEFT EAR, RECURRENCE NOT SPECIFIED: Primary | ICD-10-CM

## 2024-01-12 DIAGNOSIS — B96.89 ACUTE BACTERIAL SINUSITIS: ICD-10-CM

## 2024-01-12 DIAGNOSIS — J01.90 ACUTE BACTERIAL SINUSITIS: ICD-10-CM

## 2024-01-12 DIAGNOSIS — H73.892 CRUSTED TYMPANIC MEMBRANE OF LEFT EAR: ICD-10-CM

## 2024-01-12 DIAGNOSIS — J35.8 ASYMMETRIC TONSILS: ICD-10-CM

## 2024-01-12 NOTE — PROGRESS NOTES
Shane Nails is a 28 year old male.   Chief Complaint   Patient presents with    Follow - Up     asymmetric tonsils; symptoms are not better. Patient would like to discuss getting possible molds for ears     HPI:   Patient with worsening hearing of his left ear.    Current Outpatient Medications   Medication Sig Dispense Refill    amoxicillin clavulanate 875-125 MG Oral Tab Take 1 tablet by mouth every 12 (twelve) hours. 28 tablet 0    finasteride 5 MG Oral Tab       pantoprazole (PROTONIX) 40 MG Oral Tab EC Take 1 tablet (40 mg total) by mouth every morning before breakfast. 30 tablet 1    anastrozole 1 MG Oral Tab tab       testosterone cypionate 200 MG/ML Intramuscular Solution       predniSONE 10 MG Oral Tab Take 1 tablet (10 mg total) by mouth daily. (Patient not taking: Reported on 1/12/2024) 3 tablet 0    azithromycin (ZITHROMAX Z-PAULA) 250 MG Oral Tab Take 1 by oral route every day for 10 days. 2 tablets today. (Patient not taking: Reported on 1/9/2024) 11 tablet 0    amoxicillin clavulanate 875-125 MG Oral Tab Take 1 tablet by mouth every 12 (twelve) hours. (Patient not taking: Reported on 6/22/2023) 28 tablet 0    predniSONE 10 MG Oral Tab Take 1 tablet (10 mg total) by mouth daily. (Patient not taking: Reported on 6/22/2023) 3 tablet 0      Past Medical History:   Diagnosis Date    Lung nodules     Seasonal allergies       Social History:  Social History     Socioeconomic History    Marital status: Single   Tobacco Use    Smoking status: Never    Smokeless tobacco: Never   Vaping Use    Vaping Use: Every day    Start date: 1/1/2016    Substances: Nicotine    Devices: Disposable   Substance and Sexual Activity    Alcohol use: Yes     Alcohol/week: 1.0 standard drink of alcohol     Types: 1 Shots of liquor per week     Comment: Occasional        REVIEW OF SYSTEMS:   GENERAL HEALTH: feels well otherwise  GENERAL : denies fever, chills, sweats, weight loss, weight gain  SKIN: denies any unusual skin lesions  or rashes  RESPIRATORY: denies shortness of breath with exertion  NEURO: denies headaches    EXAM:   Ht 5' 10\" (1.778 m)   Wt 182 lb (82.6 kg)   BMI 26.11 kg/m²   System Details   Skin Inspection - Normal.   Constitutional Overall appearance - Normal.   Head/Face Facial features - Normal. Eyebrows - Normal. Skull - Normal.   Eyes Conjunctiva - Right: Normal, Left: Normal. Pupil - Right: Normal, Left: Normal.    Ears Inspection - Right: Normal, Left: Normal.   Canal - Right: Normal, Left: Normal.   TM - Right: Normal, Left: Crusting over the entire left tympanic membrane removed.  Consent was obtained.  Using the operating microscope, a crust off the entire tympanic membrane was removed.  Underlying serous otitis media was still present.  No perforations.   Nasal External nose - Normal.   Nasal septum - Normal.  Turbinates -no further purulence either side   Oral/Oropharynx Lips - Normal, Tonsils -asymmetric tonsils right greater than left, tongue - Normal    Neck Exam Inspection - Normal. Palpation - Normal. Parotid gland - Normal. Thyroid gland - Normal.   Lymph Detail Submental. Submandibular. Anterior cervical. Posterior cervical. Supraclavicular all without enlargement   Psychiatric Orientation - Oriented to time, place, person & situation. Appropriate mood and affect.   Neurological Memory - Normal. Cranial nerves - Cranial nerves II through XII grossly intact.     ASSESSMENT AND PLAN:   1. Acute serous otitis media of left ear, recurrence not specified  Persistent left serous otitis media.  Patient to finish Augmentin.  Has finished prednisone.    2. Acute bacterial sinusitis  Markedly improved after prednisone and a Augmentin.    3. Asymmetric tonsils  No change    4. Crusted tympanic membrane of left ear  removed as above.  This most certainly was causing his decreased hearing.  Hearing markedly improved after removal  Follow-up in 2 to 3 weeks time.      The patient indicates understanding of these issues  and agrees to the plan.      Shakila Glass MD  1/12/2024  1:23 PM

## 2024-01-12 NOTE — PATIENT INSTRUCTIONS
You have crusting over the left tympanic membrane.  This was removed.  I suspect the ear had perforated and then healed.  Have a persistent left serous otitis media.  Your sinusitis seems improved after the Augmentin and prednisone.  Finish the Augmentin and follow-up in 2 to 3 weeks time, sooner if problems.

## 2024-01-26 ENCOUNTER — OFFICE VISIT (OUTPATIENT)
Dept: OTOLARYNGOLOGY | Facility: CLINIC | Age: 29
End: 2024-01-26

## 2024-01-26 VITALS — HEIGHT: 70 IN | WEIGHT: 182 LBS | BODY MASS INDEX: 26.05 KG/M2

## 2024-01-26 DIAGNOSIS — H69.93 DYSFUNCTION OF BOTH EUSTACHIAN TUBES: ICD-10-CM

## 2024-01-26 DIAGNOSIS — H93.13 BILATERAL TINNITUS: Primary | ICD-10-CM

## 2024-01-26 PROCEDURE — 3008F BODY MASS INDEX DOCD: CPT | Performed by: SPECIALIST

## 2024-01-26 PROCEDURE — 99213 OFFICE O/P EST LOW 20 MIN: CPT | Performed by: SPECIALIST

## 2024-01-26 RX ORDER — PREDNISONE 10 MG/1
10 TABLET ORAL DAILY
Qty: 3 TABLET | Refills: 0 | Status: SHIPPED | OUTPATIENT
Start: 2024-01-26

## 2024-01-27 NOTE — PATIENT INSTRUCTIONS
Placed you on 3 days of 10 mg of prednisone for your eustachian tube dysfunction.  If your clicking and hearing loss continues, an audiogram was recommended.  You can call [phone] to schedule.

## 2024-01-27 NOTE — PROGRESS NOTES
Shane Nails is a 28 year old male.   Chief Complaint   Patient presents with    Follow - Up     acute serous otitis media of left ear, recurrence not specified     HPI:   Patient here, left ear feels better however there is still some clicking.    Current Outpatient Medications   Medication Sig Dispense Refill    predniSONE 10 MG Oral Tab Take 1 tablet (10 mg total) by mouth daily. 3 tablet 0    amoxicillin clavulanate 875-125 MG Oral Tab Take 1 tablet by mouth every 12 (twelve) hours. 28 tablet 0    finasteride 5 MG Oral Tab       pantoprazole (PROTONIX) 40 MG Oral Tab EC Take 1 tablet (40 mg total) by mouth every morning before breakfast. 30 tablet 1    anastrozole 1 MG Oral Tab tab       testosterone cypionate 200 MG/ML Intramuscular Solution       azithromycin (ZITHROMAX Z-PAULA) 250 MG Oral Tab Take 1 by oral route every day for 10 days. 2 tablets today. (Patient not taking: Reported on 1/9/2024) 11 tablet 0    amoxicillin clavulanate 875-125 MG Oral Tab Take 1 tablet by mouth every 12 (twelve) hours. (Patient not taking: Reported on 6/22/2023) 28 tablet 0    predniSONE 10 MG Oral Tab Take 1 tablet (10 mg total) by mouth daily. (Patient not taking: Reported on 6/22/2023) 3 tablet 0      Past Medical History:   Diagnosis Date    Lung nodules     Seasonal allergies       Social History:  Social History     Socioeconomic History    Marital status: Single   Tobacco Use    Smoking status: Never    Smokeless tobacco: Never   Vaping Use    Vaping Use: Every day    Start date: 1/1/2016    Substances: Nicotine    Devices: Disposable   Substance and Sexual Activity    Alcohol use: Yes     Alcohol/week: 1.0 standard drink of alcohol     Types: 1 Shots of liquor per week     Comment: Occasional        REVIEW OF SYSTEMS:   GENERAL HEALTH: feels well otherwise  GENERAL : denies fever, chills, sweats, weight loss, weight gain  SKIN: denies any unusual skin lesions or rashes  RESPIRATORY: denies shortness of breath with  exertion  NEURO: denies headaches    EXAM:   Ht 5' 10\" (1.778 m)   Wt 182 lb (82.6 kg)   BMI 26.11 kg/m²   System Details   Skin Inspection - Normal.   Constitutional Overall appearance - Normal.   Head/Face Facial features - Normal. Eyebrows - Normal. Skull - Normal.   Eyes Conjunctiva - Right: Normal, Left: Normal. Pupil - Right: Normal, Left: Normal.    Ears Inspection - Right: Normal, Left: Normal.   Canal - Right: Normal, Left: Normal.   TM - Right: Normal, Left: Normal.  No middle ear fluid appreciated either ear  Tuning fork 512 Hz air greater than bone bilaterally however right slightly louder than left   Nasal External nose - Normal.   Nasal septum - Normal.  Turbinates - Normal.   Oral/Oropharynx Lips - Normal, Tonsils -slightly asymmetric tonsils right larger than left tongue - Normal    Neck Exam Inspection - Normal. Palpation - Normal. Parotid gland - Normal. Thyroid gland - Normal.   Lymph Detail Submental. Submandibular. Anterior cervical. Posterior cervical. Supraclavicular all without enlargement   Psychiatric Orientation - Oriented to time, place, person & situation. Appropriate mood and affect.   Neurological Memory - Normal. Cranial nerves - Cranial nerves II through XII grossly intact.     ASSESSMENT AND PLAN:   1. Bilateral tinnitus  Will get audiogram if symptoms persist.  Patient placed on 3 days of prednisone.  Possible hearing right but better than left.  - Audiology Referral - [place] ([facility])    2. Dysfunction of both eustachian tubes  Trial of prednisone.      The patient indicates understanding of these issues and agrees to the plan.      Shakila Glass MD  1/26/2024  8:11 PM

## 2024-02-27 ENCOUNTER — OFFICE VISIT (OUTPATIENT)
Dept: OTOLARYNGOLOGY | Facility: CLINIC | Age: 29
End: 2024-02-27
Payer: COMMERCIAL

## 2024-02-27 VITALS — HEIGHT: 70 IN | WEIGHT: 182 LBS | BODY MASS INDEX: 26.05 KG/M2

## 2024-02-27 DIAGNOSIS — H61.22 CERUMEN DEBRIS ON TYMPANIC MEMBRANE OF LEFT EAR: ICD-10-CM

## 2024-02-27 DIAGNOSIS — H69.93 DYSFUNCTION OF BOTH EUSTACHIAN TUBES: Primary | ICD-10-CM

## 2024-02-27 PROCEDURE — 69210 REMOVE IMPACTED EAR WAX UNI: CPT | Performed by: SPECIALIST

## 2024-02-27 PROCEDURE — 3008F BODY MASS INDEX DOCD: CPT | Performed by: SPECIALIST

## 2024-02-27 PROCEDURE — 99213 OFFICE O/P EST LOW 20 MIN: CPT | Performed by: SPECIALIST

## 2024-02-28 NOTE — PROGRESS NOTES
Shane Nails is a 28 year old male.   Chief Complaint   Patient presents with    Ear Problem     Ear clogged      HPI:   Patient here as he is having occlusion of his hearing on the left ear    Current Outpatient Medications   Medication Sig Dispense Refill    finasteride 5 MG Oral Tab       pantoprazole (PROTONIX) 40 MG Oral Tab EC Take 1 tablet (40 mg total) by mouth every morning before breakfast. 30 tablet 1    anastrozole 1 MG Oral Tab tab       testosterone cypionate 200 MG/ML Intramuscular Solution       predniSONE 10 MG Oral Tab Take 1 tablet (10 mg total) by mouth daily. (Patient not taking: Reported on 2/27/2024) 3 tablet 0    amoxicillin clavulanate 875-125 MG Oral Tab Take 1 tablet by mouth every 12 (twelve) hours. (Patient not taking: Reported on 2/27/2024) 28 tablet 0    azithromycin (ZITHROMAX Z-PAULA) 250 MG Oral Tab Take 1 by oral route every day for 10 days. 2 tablets today. (Patient not taking: Reported on 1/9/2024) 11 tablet 0    amoxicillin clavulanate 875-125 MG Oral Tab Take 1 tablet by mouth every 12 (twelve) hours. (Patient not taking: Reported on 6/22/2023) 28 tablet 0    predniSONE 10 MG Oral Tab Take 1 tablet (10 mg total) by mouth daily. (Patient not taking: Reported on 6/22/2023) 3 tablet 0      Past Medical History:   Diagnosis Date    Lung nodules     Seasonal allergies       Social History:  Social History     Socioeconomic History    Marital status: Single   Tobacco Use    Smoking status: Never    Smokeless tobacco: Never   Vaping Use    Vaping Use: Every day    Start date: 1/1/2016    Substances: Nicotine    Devices: Disposable   Substance and Sexual Activity    Alcohol use: Yes     Alcohol/week: 1.0 standard drink of alcohol     Types: 1 Shots of liquor per week     Comment: Occasional        REVIEW OF SYSTEMS:   GENERAL HEALTH: feels well otherwise  GENERAL : denies fever, chills, sweats, weight loss, weight gain  SKIN: denies any unusual skin lesions or rashes  RESPIRATORY:  denies shortness of breath with exertion  NEURO: denies headaches    EXAM:   Ht 5' 10\" (1.778 m)   Wt 182 lb (82.6 kg)   BMI 26.11 kg/m²   System Details   Skin Inspection - Normal.   Constitutional Overall appearance - Normal.   Head/Face Facial features - Normal. Eyebrows - Normal. Skull - Normal.   Eyes Conjunctiva - Right: Normal, Left: Normal. Pupil - Right: Normal, Left: Normal.    Ears Inspection - Right: Normal, Left: Normal.   Canal - Right: Normal, Left: Normal.   TM -check to tympanic membranes no middle ear fluid.  Large crust over the entire left tympanic membrane.  This was removed using microscopic visualization and using both suctions and alligator forceps.  Consent was obtained prior to the procedure.  Fork 512 Hz right equals left   Nasal External nose - Normal.      Oral/Oropharynx Lips - Normal,   Neck Exam Inspection - Normal. Palpation - Normal. Parotid gland - Normal. Thyroid gland - Normal.   Lymph Detail Submental. Submandibular. Anterior cervical. Posterior cervical. Supraclavicular all without enlargement   Psychiatric Orientation - Oriented to time, place, person & situation. Appropriate mood and affect.   Neurological Memory - Normal. Cranial nerves - Cranial nerves II through XII grossly intact.     ASSESSMENT AND PLAN:   1. Dysfunction of both eustachian tubes  Some retraction but no middle ear fluid    2. Cerumen debris on tympanic membrane of left ear  Fully cleaned under microscopic visualization.  Avoid cotton swabs.  Follow-up in 3 to 4 weeks time, sooner if problems.    - Removal Impacted Cerumen Requiring Instrumentation, Unilateral      The patient indicates understanding of these issues and agrees to the plan.      Shakila Glass MD  2/27/2024  9:34 PM

## 2024-02-28 NOTE — PATIENT INSTRUCTIONS
When was fully cleaned from both left ear.  Avoid using cotton swabs.  Follow-up in 3 to 4 months time, sooner if problems.  No evidence of middle ear fluid on the day to your visit.

## 2024-03-07 ENCOUNTER — PATIENT MESSAGE (OUTPATIENT)
Dept: INTERNAL MEDICINE CLINIC | Facility: CLINIC | Age: 29
End: 2024-03-07

## 2024-03-07 ENCOUNTER — TELEPHONE (OUTPATIENT)
Dept: INTERNAL MEDICINE CLINIC | Facility: CLINIC | Age: 29
End: 2024-03-07

## 2024-03-07 NOTE — TELEPHONE ENCOUNTER
EDEL to Dr. Shubham Rodriguez sent instructing patient to schedule Physical/Establish care visit (former Dr. Poole pt).

## 2024-03-07 NOTE — TELEPHONE ENCOUNTER
Patient mother Roxana calling for appointment.    Had blood work done that specialist ordered.  Iron is coming back high.    He also sees another specialist that is giving injections of Xyostedsqsoln auto injection 100mg/5ml based on blood panel results.    Best call back number [phone]     Mother is aware she is not on HIPAA. She will inform patient we will be calling him back.

## 2024-03-07 NOTE — TELEPHONE ENCOUNTER
Spoke with patient.    Reports he gets frequent full panel blood tests at [facility].    State his iron levels have previously came back elevated and his most recent blood test showed elevated iron levels and he is concerned.    He states he googled it and there were many possible causes. Pt looking for answers (could it be caused by his medications/testosterone injections, diet, etc.).    Pt will send results via MyChart msg.    Pt is a former Dr. Poole patient and is due for a physical/establish care visit with Dr. Jalloh.       Awaiting [place].

## 2024-03-07 NOTE — TELEPHONE ENCOUNTER
From: Shane Nails  To: Bradley Jalloh  Sent: 3/7/2024 3:56 PM CST  Subject: Blood Test results    Attached as requested from the nurse.    I have other attachments I can send you for the most recent blood test, the result that i am concerned about is the iron spike that happens randomly throughout the year.   Why it concerns me is I tend to experience levels of fatigue that's typically abnormal of my baseline.     Full disclosure, I am on TRT. I request this extensive bloodwork just for a peace of mind for myself through the clinic.    In regards to the test levels being so low as of late, I have been unable to make it to the clinic for injection which is why they are currently low. Unfortunately I can't inject myself due to anxiety haha. LDL is usually a margin of error due to poor diet. I have previous records from Dr. Poole that my heart is healthy with no abnormalities. If you have any questions or need further context, please feel free to contact me on my cell phone anytime. [phone]    Thank you

## 2024-04-08 NOTE — H&P
Shane Ortez is a 28 year old male.    HPI:     Chief Complaint   Patient presents with    Physical     New patient Px     Mr. ORTEZ is a 28 year old male PMHX as below coming in for annual physical examination     He is feeling so-so. Malorodous urine. No burning with urination    He does have a rash in the R elbow. Itchy, but it has been getting better    He has had some concern for his prior ADHD. He has problems with completing tasks. Problems with executive functioning. Had side effects -     PMhx  History of left shoulder injury  Status post left Bankart repair and arthroscopy 11/2021 one of the left shoulder  He did have a recent aggravation.     History of low testosterone  Receiving testosterone injections at a men's health clinic weekly.  Has had elevated testosterone levels previously advised to stop taking the injections.  - He is not able to self-inject due to anxiety  - He did not have a good response to subQ injections.  Undergoing injections again with cyprionate.   - Will have repeat labs   - History of fatigue, depression, anxiety refractory to SSRI medication - multiple regimens.     Seasonal allergies  He may have some contact dermatitis symptoms with red bumps that can    I reviewed and updated the PMHx, FamHx, medications, allergies, and SocHx as below with the patient    SocHX  - Home: feels safe at home  - Work: feels safe at work - works outside   - Hobbies: mali, skating   - Nutrition: tries to watch what he eats. Quick - tacqueria, burgers. Portion control. Grilled over fried. Does get salads, may harry to drink more fruits.  - Physical Activity: if he is not fried from work - walking, weight lifting.       HISTORY:  Past Medical History:   Diagnosis Date    Lung nodules     Seasonal allergies       Past Surgical History:   Procedure Laterality Date    OTHER      left shoulder srthroscopy      Family History   Problem Relation Age of Onset    Cancer Maternal Grandfather         lymphoma     Cancer Paternal Grandmother         lymphoma    Cancer Paternal Grandfather         prostate cancer    Other (aneurysm) Paternal Grandfather     Other (aneurysm) Paternal Cousin Female     Other (Other) Paternal Cousin Female 33      Social History:   Social History     Socioeconomic History    Marital status: Single   Tobacco Use    Smoking status: Never    Smokeless tobacco: Never   Vaping Use    Vaping Use: Every day    Start date: 1/1/2016    Substances: Nicotine, Flavoring    Devices: Disposable   Substance and Sexual Activity    Alcohol use: Yes     Alcohol/week: 1.0 standard drink of alcohol     Types: 1 Shots of liquor per week     Comment: Occasional    Drug use: Never        Medications (Active prior to today's visit):  Current Outpatient Medications   Medication Sig Dispense Refill    lisdexamfetamine 30 MG Oral Cap Take 1 capsule (30 mg total) by mouth every morning. 30 capsule 0    finasteride 5 MG Oral Tab daily as needed.      anastrozole 1 MG Oral Tab tab Take 1 tablet (1 mg total) by mouth twice a week.      testosterone cypionate 200 MG/ML Intramuscular Solution once a week.         Allergies:  Allergies   Allergen Reactions    Dust Mite Extract OTHER (SEE COMMENTS)     Other reaction(s): Congestion      Prednisone FATIGUE     Upset stomach   Other reaction(s): WEAKNESS  Upset stomach            ROS:   Positive Findings indicated in BOLD    Constitutional: Fever, Chills, Weight Gain, Weight Loss, Night Sweats, Fatigue, Malaise  ENT/Mouth:  Hearing Changes, Ear Pain, Nasal Congestion, Sinus Pain, Hoarseness, Sore throat, Rhinorrhea, Swallowing Difficulty  Eyes: Eye Pain, Swelling, Redness, Foreign Body, Discharge, Vision Changes  Cardiovascular: Chest Pain, SOB, PND, Dyspnea on Exertion, Orthopnea, Claudication, Edema, Palpitations  Respiratory: Cough, Sputum, Wheezing, Shortness of breath  Gastrointestinal: Nausea, Vomiting, Diarrhea, Constipation, Pain, Heartburn, Dysphagia, Bloody stools,  Tarry stools  Genitourinary: Dysmenorrhea, Dysuria, Urinary Frequency, Hematuria, Urinary Incontinence, Urgency,  Flank Pain  Musculoskeletal: Arthralgias, Myalgias, Joint Swelling, Joint Stiffness, Back Pain, Neck Pain  Integumentary: Skin Lesions, Pruritis, Hair Changes, Jaundice, Nail changes  Neuro: Weakness, Numbness, Paresthesias, Loss of Consciousness, Syncope, Dizziness, Headache, Falls  Psych: Anxiety, Depression, Insomnia, Suicidal Ideation, Homicidal ideation, Memory Changes, Concentration deficit  Heme/Lymph: Bruising, Bleeding, Lymphadenopathy  Endocrine: Polyuria, Polydipsia, Temperature Intolerance    PHYSICAL EXAM:   Vital Signs:  Blood pressure 116/72, pulse 76, temperature 98.3 °F (36.8 °C), height 5' 10\" (1.778 m), weight 173 lb 3.2 oz (78.6 kg), SpO2 97%.     Constitutional: No acute distress. Alert and oriented x 3.  Eyes: EOMI, PERRLA, clear sclera b/l  HENT: NCAT, Moist mucous membranes, Oropharynx without erythema or exudates  Neck: No JVD, no thyromegaly  Cardiovascular: S1, S2, no S3, no S4, Regular rate and rhythm, No murmurs/gallops/rubs.   Vascular: Equal pulses 2+ carotids no bruits or thrills/radial/DP/PT bilaterally  Respiratory: Clear to auscultation bilaterally.  No wheezes/rales/rhonchi  Gastrointestinal: Soft, nontender, nondistended. Positive bowel sounds x 4. No rebound tenderness. No hepatomegaly, No splenomegaly  Genitourinary: No CVA tenderness bilaterally  -No hernias noted bilaterally  - No skin rashes, ulceration in the groin area  Neurologic: No focal neurological deficits, CN II-XII intact, light touch intact, MSK Strength 5/5 and symmetric in all extremities, normal gait, 2+ patellar tendon  Musculoskeletal: Full range of motion of all extremities, no clubbing/swelling/edema  Skin: + 3 isolated erythematous papules on the right elbow  Psychiatric: Appropriate mood and affect  Heme/Lymph/Immune: No cervical LAD      DATA REVIEWED   Labs:  No results found for this or any  previous visit (from the past 8760 hour(s)).    No results found for this or any previous visit (from the past 8760 hour(s)).    Cholesterol, Total (mg/dL)   Date Value   06/21/2022 143     HDL Cholesterol (mg/dL)   Date Value   06/21/2022 30 (L)     LDL Cholesterol (mg/dL)   Date Value   06/21/2022 82     Triglycerides (mg/dL)   Date Value   06/21/2022 177 (H)       Last A1c value was 5.3% done 6/21/2022.        Imaging:  CT chest 3/29/2023    Impression   CONCLUSION:     Subcentimeter subpleural nodules within the right middle lobe were not imaged on 6/12/2022.     0.4 cm right lower lobe subpleural nodule is unchanged since 6/12/2022.     Followup chest CT recommended in 12 months to demonstrate resolution/stability.       2D echo 4/6/2023    Conclusions:     1. Left ventricle: The cavity size was normal. Wall thickness was normal.      Systolic function was normal. The estimated ejection fraction was 55%, by      visual assessment. No diagnostic evidence for regional wall motion      abnormalities. Left ventricular diastolic function parameters were normal      for the patient's age.   2. Right ventricle: The cavity size was normal. Systolic function was low      normal.   3. Left atrium: The left atrial volume was normal.   4. No significant valvular regurgitation or stenosis.   Impressions:  No previous study was available for comparison.   *         ASSESSMENT/PLAN:     Lung nodule  Seen on CT scan 3/29, subcentimeter subpleural nodules within the right middle lobe not previously seen.  There is a 0.4 cm right lower lobe subpleural nodule unchanged in 6/2022  - Repeat CT scan is recommended, ordered today    History of left shoulder injury  Status post left Bankart repair and arthroscopy 11/2020 one of the left shoulder  - Seems to be stable  - Continue with home stretches and exercises for now    History of low testosterone  Receiving testosterone injections at a men's health clinic weekly.  Has had elevated  testosterone levels previously   - Testosterone levels are on the lower end  - Plan to repeat testosterone levels through men's health clinic  - Should send us the results    Vitamin D deficiency  - Last vitamin D level 24  - Continue vitamin D supplementation  - Plan to repeat vitamin D at a future date    Seasonal allergies  - Trial of flonase 1 spray each nostril until symptoms improve  - Trial of antihistamine such as Zyrtec/Claritin/Allegra once a day    ADHD  Concern for completion of tasks, difficulty with executive functioning  - Previously on Vyvanse, did not tolerate Adderall  - Will start Vyvanse 30 mg once a day.   will send us a [employer] message in 3 weeks on how the medication is working for us  -We discussed the potential side effects of long-term stimulant use which include high blood pressure, chest pain, palpitations, unintended weight loss due to poor appetite, insomnia, increased anxiety.  We should do periodic check ins to make sure that it is still safe to continue stimulant medication.  The goal is to control symptoms while minimizing side effects with the lowest effective dose and the shortest duration of time.    Health maintenance  - Will send for STD screening, encouraged safe sex practices with use of condoms       Orders This Visit:  Orders Placed This Encounter   Procedures    HIV AG AB Combo    T PALLIDUM SCREENING CASCADE    Urinalysis with Culture Reflex    HCV Antibody    Hepatitis B Surface Antigen    Chlamydia/Gc Amplification       Meds This Visit:  Requested Prescriptions     Signed Prescriptions Disp Refills    lisdexamfetamine 30 MG Oral Cap 30 capsule 0     Sig: Take 1 capsule (30 mg total) by mouth every morning.       Imaging & Referrals:  CT CHEST (CPT=71250)       Health Maintenance  HTN Screen: BP at goal  DM Screen: Check fasting sugar  HLD Screen: Check fasting lipid panel  HCV Screen: Considered low risk  HIV Screen: considered low risk  G/C/Syphilis: Considered low  risk    Colon Cancer Screening (45-70): Not indicated  Prostate Cancer Screening: (50-70): Not indicated  Lung Cancer Screening (55-79 with 30 p/year and active < 15 years): Not indicated  AAA Screening (65-75 Hx of smoking): Not indicated    Influenza: Annually   Td/Tdap: Last Tdap - recommended  Zoster (50+): Not indicated  HPV (19-26): Not indicated  Pneumococcal: Not indicated    Immunization History   Administered Date(s) Administered    Covid-19 Vaccine Pfizer 30 mcg/0.3 ml 04/15/2021, 05/03/2021         Return to clinic in 6-12 months for follow-up    Bradley Jalloh MD, 04/09/24, 1:43 PM

## 2024-04-08 NOTE — PATIENT INSTRUCTIONS
- You were seen in clinic for regular annual check-up.   We reviewed your most recent set of outside blood work.  Overall levels look good    Your testosterone levels are on the low side.  Follow-up with your men's health clinic for adequate supplementation  -We need to ensure that testosterone levels remain in the normal range with the new formulation.  Please MyChart message us the results of your upcoming testosterone levels    - Continue with your home vitamin D supplementation    -We did review your CT scan which showed 2 pulmonary nodules.  We should repeat this to ensure that these have not changed    We will resume ADHD medication with Vyvanse  - Will start Vyvanse 30 mg once a day.   will send us a MyChart message in 3 weeks on how the medication is working for us  -We discussed the potential side effects of long-term stimulant use which include high blood pressure, chest pain, palpitations, unintended weight loss due to poor appetite, insomnia, increased anxiety.  We should do periodic check ins to make sure that it is still safe to continue stimulant medication.  The goal is to control symptoms while minimizing side effects with the lowest effective dose and the shortest duration of time.    He will also order STD screening.  - Continue with good use of condoms for safe sex practices.    -Vaccines he may be due for: COVID dose #3, tetanus shot  - Please continue to eat a varied diet including recommended servings of vegetables, fruits, and low fat dairy. Minimize high saturated fats (such as fast foods) and high sugar intake (such as soda)  - We recommend 150 minutes of moderate intensity exercise (brisk walking, swimming) weekly to maintain your current weight.  Targeted weight loss will require more vigorous exercise or more than 150 minutes/week.    Return to clinic in 6-12 months for follow-up

## 2024-04-09 ENCOUNTER — LAB ENCOUNTER (OUTPATIENT)
Dept: LAB | Age: 29
End: 2024-04-09
Attending: INTERNAL MEDICINE
Payer: COMMERCIAL

## 2024-04-09 ENCOUNTER — OFFICE VISIT (OUTPATIENT)
Dept: INTERNAL MEDICINE CLINIC | Facility: CLINIC | Age: 29
End: 2024-04-09

## 2024-04-09 VITALS
SYSTOLIC BLOOD PRESSURE: 116 MMHG | WEIGHT: 173.19 LBS | OXYGEN SATURATION: 97 % | HEART RATE: 76 BPM | TEMPERATURE: 98 F | DIASTOLIC BLOOD PRESSURE: 72 MMHG | HEIGHT: 70 IN | BODY MASS INDEX: 24.79 KG/M2

## 2024-04-09 DIAGNOSIS — J30.2 SEASONAL ALLERGIES: ICD-10-CM

## 2024-04-09 DIAGNOSIS — Z13.0 SCREENING FOR DEFICIENCY ANEMIA: ICD-10-CM

## 2024-04-09 DIAGNOSIS — E55.9 VITAMIN D DEFICIENCY: ICD-10-CM

## 2024-04-09 DIAGNOSIS — R82.90 MALODOROUS URINE: ICD-10-CM

## 2024-04-09 DIAGNOSIS — Z00.00 PHYSICAL EXAM: Primary | ICD-10-CM

## 2024-04-09 DIAGNOSIS — F90.0 ATTENTION DEFICIT HYPERACTIVITY DISORDER (ADHD), PREDOMINANTLY INATTENTIVE TYPE: ICD-10-CM

## 2024-04-09 DIAGNOSIS — Z13.220 SCREENING FOR LIPOID DISORDERS: ICD-10-CM

## 2024-04-09 DIAGNOSIS — Z11.3 SCREEN FOR STD (SEXUALLY TRANSMITTED DISEASE): ICD-10-CM

## 2024-04-09 DIAGNOSIS — R91.1 PULMONARY NODULE: ICD-10-CM

## 2024-04-09 DIAGNOSIS — R79.89 LOW TESTOSTERONE IN MALE: ICD-10-CM

## 2024-04-09 DIAGNOSIS — Z00.00 PHYSICAL EXAM: ICD-10-CM

## 2024-04-09 LAB
ANION GAP SERPL CALC-SCNC: 5 MMOL/L (ref 0–18)
BASOPHILS # BLD AUTO: 0.04 X10(3) UL (ref 0–0.2)
BASOPHILS NFR BLD AUTO: 0.7 %
BILIRUB UR QL: NEGATIVE
BUN BLD-MCNC: 8 MG/DL (ref 9–23)
BUN/CREAT SERPL: 7.7 (ref 10–20)
CALCIUM BLD-MCNC: 10.1 MG/DL (ref 8.7–10.4)
CHLORIDE SERPL-SCNC: 104 MMOL/L (ref 98–112)
CHOLEST SERPL-MCNC: 183 MG/DL (ref ?–200)
CLARITY UR: CLEAR
CO2 SERPL-SCNC: 29 MMOL/L (ref 21–32)
COLOR UR: YELLOW
CREAT BLD-MCNC: 1.04 MG/DL
DEPRECATED RDW RBC AUTO: 46.2 FL (ref 35.1–46.3)
EGFRCR SERPLBLD CKD-EPI 2021: 99 ML/MIN/1.73M2 (ref 60–?)
EOSINOPHIL # BLD AUTO: 0.06 X10(3) UL (ref 0–0.7)
EOSINOPHIL NFR BLD AUTO: 1.1 %
ERYTHROCYTE [DISTWIDTH] IN BLOOD BY AUTOMATED COUNT: 13.3 % (ref 11–15)
FASTING PATIENT LIPID ANSWER: YES
FASTING STATUS PATIENT QL REPORTED: YES
GLUCOSE BLD-MCNC: 84 MG/DL (ref 70–99)
GLUCOSE UR-MCNC: NORMAL MG/DL
HBV SURFACE AG SER-ACNC: <0.1 [IU]/L
HBV SURFACE AG SERPL QL IA: NONREACTIVE
HCT VFR BLD AUTO: 50 %
HCV AB SERPL QL IA: NONREACTIVE
HDLC SERPL-MCNC: 41 MG/DL (ref 40–59)
HGB BLD-MCNC: 16.6 G/DL
HGB UR QL STRIP.AUTO: NEGATIVE
IMM GRANULOCYTES # BLD AUTO: 0.02 X10(3) UL (ref 0–1)
IMM GRANULOCYTES NFR BLD: 0.4 %
KETONES UR-MCNC: NEGATIVE MG/DL
LDLC SERPL CALC-MCNC: 128 MG/DL (ref ?–100)
LEUKOCYTE ESTERASE UR QL STRIP.AUTO: 25
LYMPHOCYTES # BLD AUTO: 1.75 X10(3) UL (ref 1–4)
LYMPHOCYTES NFR BLD AUTO: 31.6 %
MCH RBC QN AUTO: 31.3 PG (ref 26–34)
MCHC RBC AUTO-ENTMCNC: 33.2 G/DL (ref 31–37)
MCV RBC AUTO: 94.2 FL
MONOCYTES # BLD AUTO: 0.41 X10(3) UL (ref 0.1–1)
MONOCYTES NFR BLD AUTO: 7.4 %
NEUTROPHILS # BLD AUTO: 3.26 X10 (3) UL (ref 1.5–7.7)
NEUTROPHILS # BLD AUTO: 3.26 X10(3) UL (ref 1.5–7.7)
NEUTROPHILS NFR BLD AUTO: 58.8 %
NITRITE UR QL STRIP.AUTO: NEGATIVE
NONHDLC SERPL-MCNC: 142 MG/DL (ref ?–130)
OSMOLALITY SERPL CALC.SUM OF ELEC: 284 MOSM/KG (ref 275–295)
PH UR: 8 [PH] (ref 5–8)
PLATELET # BLD AUTO: 346 10(3)UL (ref 150–450)
POTASSIUM SERPL-SCNC: 4.2 MMOL/L (ref 3.5–5.1)
PROT UR-MCNC: 20 MG/DL
RBC # BLD AUTO: 5.31 X10(6)UL
SODIUM SERPL-SCNC: 138 MMOL/L (ref 136–145)
SP GR UR STRIP: 1.02 (ref 1–1.03)
T PALLIDUM AB SER QL IA: NONREACTIVE
TRIGL SERPL-MCNC: 74 MG/DL (ref 30–149)
UROBILINOGEN UR STRIP-ACNC: 3
VLDLC SERPL CALC-MCNC: 13 MG/DL (ref 0–30)
WBC # BLD AUTO: 5.5 X10(3) UL (ref 4–11)

## 2024-04-09 PROCEDURE — 85025 COMPLETE CBC W/AUTO DIFF WBC: CPT

## 2024-04-09 PROCEDURE — 87086 URINE CULTURE/COLONY COUNT: CPT

## 2024-04-09 PROCEDURE — 87389 HIV-1 AG W/HIV-1&-2 AB AG IA: CPT

## 2024-04-09 PROCEDURE — 3008F BODY MASS INDEX DOCD: CPT | Performed by: INTERNAL MEDICINE

## 2024-04-09 PROCEDURE — 87491 CHLMYD TRACH DNA AMP PROBE: CPT

## 2024-04-09 PROCEDURE — 80061 LIPID PANEL: CPT

## 2024-04-09 PROCEDURE — 99395 PREV VISIT EST AGE 18-39: CPT | Performed by: INTERNAL MEDICINE

## 2024-04-09 PROCEDURE — 87591 N.GONORRHOEAE DNA AMP PROB: CPT

## 2024-04-09 PROCEDURE — 86780 TREPONEMA PALLIDUM: CPT

## 2024-04-09 PROCEDURE — 87340 HEPATITIS B SURFACE AG IA: CPT

## 2024-04-09 PROCEDURE — 86803 HEPATITIS C AB TEST: CPT

## 2024-04-09 PROCEDURE — 81001 URINALYSIS AUTO W/SCOPE: CPT

## 2024-04-09 PROCEDURE — 3074F SYST BP LT 130 MM HG: CPT | Performed by: INTERNAL MEDICINE

## 2024-04-09 PROCEDURE — 80048 BASIC METABOLIC PNL TOTAL CA: CPT

## 2024-04-09 PROCEDURE — 36415 COLL VENOUS BLD VENIPUNCTURE: CPT

## 2024-04-09 PROCEDURE — 3078F DIAST BP <80 MM HG: CPT | Performed by: INTERNAL MEDICINE

## 2024-04-09 RX ORDER — LISDEXAMFETAMINE DIMESYLATE CAPSULES 30 MG/1
30 CAPSULE ORAL EVERY MORNING
Qty: 30 CAPSULE | Refills: 0 | Status: SHIPPED | OUTPATIENT
Start: 2024-04-09

## 2024-04-10 LAB
C TRACH DNA SPEC QL NAA+PROBE: NEGATIVE
N GONORRHOEA DNA SPEC QL NAA+PROBE: NEGATIVE

## 2024-04-12 ENCOUNTER — TELEPHONE (OUTPATIENT)
Dept: INTERNAL MEDICINE CLINIC | Facility: CLINIC | Age: 29
End: 2024-04-12

## 2024-04-12 NOTE — TELEPHONE ENCOUNTER
I sent a MyChart message with regards to labs from 4/9    LDL borderline elevated    STD screening and urine test all negative.  No infection requiring antibiotics

## 2024-05-14 ENCOUNTER — TELEPHONE (OUTPATIENT)
Dept: INTERNAL MEDICINE CLINIC | Facility: CLINIC | Age: 29
End: 2024-05-14

## 2024-05-14 DIAGNOSIS — F90.0 ATTENTION DEFICIT HYPERACTIVITY DISORDER (ADHD), PREDOMINANTLY INATTENTIVE TYPE: ICD-10-CM

## 2024-05-14 RX ORDER — LISDEXAMFETAMINE DIMESYLATE 30 MG/1
30 CAPSULE ORAL EVERY MORNING
Qty: 30 CAPSULE | Refills: 0 | Status: SHIPPED | OUTPATIENT
Start: 2024-05-14

## 2024-05-14 NOTE — TELEPHONE ENCOUNTER
please see MyChart response from patient and advise on refill    To MD:  The above refill request is for a controlled substance.  Please review pended medication order.   Print and sign for staff to fax to pharmacy or prescribe electronically.    Last office visit: 4/9/24  Last time refill sent and quantity/refills:  4/9/24 #30

## 2024-05-14 NOTE — TELEPHONE ENCOUNTER
Refill request received for Vyvanse. Per 4/9/24 office visit note, \"Will start Vyvanse 30 mg once a day. will send us a [employer] message in 3 weeks on how the medication is working for us\".  [employer] sent to patient.

## 2024-06-26 ENCOUNTER — TELEPHONE (OUTPATIENT)
Dept: INTERNAL MEDICINE CLINIC | Facility: CLINIC | Age: 29
End: 2024-06-26

## 2024-06-26 DIAGNOSIS — F90.0 ATTENTION DEFICIT HYPERACTIVITY DISORDER (ADHD), PREDOMINANTLY INATTENTIVE TYPE: ICD-10-CM

## 2024-06-26 RX ORDER — LISDEXAMFETAMINE DIMESYLATE 30 MG/1
30 CAPSULE ORAL DAILY
Qty: 30 CAPSULE | Refills: 0 | Status: SHIPPED | OUTPATIENT
Start: 2024-06-26 | End: 2024-07-26

## 2024-06-26 RX ORDER — LISDEXAMFETAMINE DIMESYLATE 30 MG/1
30 CAPSULE ORAL EVERY MORNING
Qty: 30 CAPSULE | Refills: 0 | Status: CANCELLED | OUTPATIENT
Start: 2024-06-26

## 2024-06-26 RX ORDER — LISDEXAMFETAMINE DIMESYLATE 30 MG/1
30 CAPSULE ORAL DAILY
Qty: 30 CAPSULE | Refills: 0 | Status: SHIPPED | OUTPATIENT
Start: 2024-08-27 | End: 2024-09-26

## 2024-06-26 RX ORDER — LISDEXAMFETAMINE DIMESYLATE 30 MG/1
30 CAPSULE ORAL DAILY
Qty: 30 CAPSULE | Refills: 0 | Status: SHIPPED | OUTPATIENT
Start: 2024-07-27 | End: 2024-08-26

## 2024-06-26 NOTE — TELEPHONE ENCOUNTER
please see note attached to patient's MyChart request.    To MD:  The above refill request is for a controlled substance.  Please review pended medication order.   Print and sign for staff to fax to pharmacy or prescribe electronically.    Last office visit: 4/9/2024  Last time refill sent and quantity/refills: 5/14/2024 #30

## 2024-08-07 DIAGNOSIS — F90.0 ATTENTION DEFICIT HYPERACTIVITY DISORDER (ADHD), PREDOMINANTLY INATTENTIVE TYPE: ICD-10-CM

## 2024-08-07 RX ORDER — LISDEXAMFETAMINE DIMESYLATE 30 MG/1
30 CAPSULE ORAL DAILY
Qty: 30 CAPSULE | Refills: 0 | Status: CANCELLED | OUTPATIENT
Start: 2024-08-07 | End: 2024-09-06

## 2024-08-08 NOTE — TELEPHONE ENCOUNTER
Pt has refills on file     Current refill request refused due to refill is either a duplicate request or has active refills at the pharmacy.  Check previous templates.    Requested Prescriptions     Pending Prescriptions Disp Refills    lisdexamfetamine (VYVANSE) 30 MG Oral Cap 30 capsule 0     Sig: Take 1 capsule (30 mg total) by mouth daily.

## 2024-08-18 ENCOUNTER — PATIENT MESSAGE (OUTPATIENT)
Dept: OTOLARYNGOLOGY | Facility: CLINIC | Age: 29
End: 2024-08-18

## 2024-08-19 ENCOUNTER — OFFICE VISIT (OUTPATIENT)
Dept: OTOLARYNGOLOGY | Facility: CLINIC | Age: 29
End: 2024-08-19

## 2024-08-19 VITALS — WEIGHT: 173 LBS | HEIGHT: 70 IN | BODY MASS INDEX: 24.77 KG/M2

## 2024-08-19 DIAGNOSIS — R07.0 THROAT PAIN: Primary | ICD-10-CM

## 2024-08-19 DIAGNOSIS — R09.82 PND (POST-NASAL DRIP): ICD-10-CM

## 2024-08-19 DIAGNOSIS — J31.2 PHARYNGITIS, CHRONIC: ICD-10-CM

## 2024-08-19 RX ORDER — KETOCONAZOLE 20 MG/G
2 CREAM TOPICAL
COMMUNITY
Start: 2024-07-08

## 2024-08-19 NOTE — TELEPHONE ENCOUNTER
From: Shane Nails  To: Shakila Glass  Sent: 8/18/2024 3:50 PM CDT  Subject: Possible infection reoccurring    If you recall from my records in January I had a nasty infection. I feel like I have the mild symptoms of it, if that makes sense. No Covid result as of the time of this message. Is it possible to get a script for antibiotics? Or would you rather have me come to the office?

## 2024-08-19 NOTE — PROGRESS NOTES
Shane Nails is a 29 year old male.   Chief Complaint   Patient presents with    Nose Problem     Right nostril pain    Throat Problem     Throat swollen     HPI:   29-year-old presents with a tight feeling in his throat that he points to an area just above his sternal notch.  He says it radiates a little down his clavicle on the left side as well.  He says that he works in a na[employer] environments.  Denies significant reflux although has had this in the past.    Current Outpatient Medications   Medication Sig Dispense Refill    lisdexamfetamine (VYVANSE) 30 MG Oral Cap Take 1 capsule (30 mg total) by mouth daily. 30 capsule 0    [START ON 8/27/2024] lisdexamfetamine (VYVANSE) 30 MG Oral Cap Take 1 capsule (30 mg total) by mouth daily. 30 capsule 0    finasteride 5 MG Oral Tab daily as needed.      anastrozole 1 MG Oral Tab tab Take 1 tablet (1 mg total) by mouth twice a week.      testosterone cypionate 200 MG/ML Intramuscular Solution once a week.      ketoconazole 2 % External Cream Apply 2 % topically. (Patient not taking: Reported on 8/19/2024)        Past Medical History:    Lung nodules    Seasonal allergies      Social History:  Social History     Socioeconomic History    Marital status: Single   Tobacco Use    Smoking status: Never    Smokeless tobacco: Never   Vaping Use    Vaping status: Every Day    Start date: 1/1/2016    Substances: Nicotine, Flavoring    Devices: Disposable   Substance and Sexual Activity    Alcohol use: Yes     Alcohol/week: 1.0 standard drink of alcohol     Types: 1 Shots of liquor per week     Comment: Occasional    Drug use: Never     Social Determinants of Health      Received [facility], [facility]    Housing Stability      Past Surgical History:   Procedure Laterality Date    Other      left shoulder srthroscopy         EXAM:   Ht 5' 10\" (1.778 m)   Wt 173 lb (78.5 kg)   BMI 24.82 kg/m²     System Details   Skin Inspection - Normal.    Constitutional Overall appearance - Normal.   Head/Face Symmetric, TMJ tenderness not present    Eyes EOMI, PERRL   Right ear:  Canal clear, TM intact, no ARIA   Left ear:  Canal clear, TM intact, no ARIA   Nose: Septum midline, inferior turbinates not enlarged, nasal valves without collapse    Oral cavity/Oropharynx: No lesions or masses on inspection or palpation, tonsils symmetric    Neck: Soft without LAD, thyroid not enlarged  Voice clear/ no stridor   Other:      SCOPES AND PROCEDURES:       Flexible Laryngoscopy Procedure Note (61699)    Due to inability for adequate examination of the larynx and need for magnification to perform the examination, endoscopy was performed.  Risks and benefits were discussed with patient/family and they have given verbal consent to proceed.    Pre-operative Diagnosis:   1. Throat pain    2. PND (post-nasal drip)    3. Pharyngitis, chronic        Post-operative Diagnosis: Same    Procedure: Diagnostic flexible fiberoptic laryngoscopy    Anesthesia: Topical anesthetic Inez     Surgeon Jason Caban MD    EBL: 0cc    Procedure Detail & Findings:     After placement of topical anesthetic intranasally the flexible laryngoscope was inserted into the nare and driven through the nasal cavity with no significant abnormal findings noted in the nasal cavities or nasopharynx. The oropharynx, hypopharynx and larynx were subsequently examined and the following findings were noted:    Posterior pharyngeal erythema and slight cobblestoning with sticky dry mucous that was clear        Base of Tongue: Normal        Valeculla: Normal        Arytenoids: Normal/Erythemous: Erythmous        Introitus of the esophagus: Normal        Epiglottis: Normal        False vocal cords: Normal        True vocal cords: Normal        Subglottic space: Normal        Mobility of True vocal cords: Normal    Condition: Stable    Complications: Patient tolerated the procedure well with no immediate  complication.    Jason Caban MD    AUDIOGRAM AND IMAGING:         IMPRESSION:   1. Throat pain    2. PND (post-nasal drip)    3. Pharyngitis, chronic       Recommendations:  -No signs of sinusitis although did have diffuse erythema and some cobblestoning with sticky mucus of his pharynx and larynx.  -May be chronic inflammation from chronic irritation in his work environment or an allergy  -Discussed he may also be experiencing symptoms of reflux possibly more near his esophagus  -He is going to try nasal irrigations and using a mask more at work  -Return precautions discussed    The patient indicates understanding of these issues and agrees to the plan.      Jason Caban MD  8/19/2024  6:01 PM

## 2024-09-29 NOTE — LETTER
1/9/2024          To Whom It May Concern:    Shane ABRAHAN Nails is currently under my medical care.      Please excuse Shane for Monday January 8, 2024- Thursday January 11, 2024.    He may return to Friday January 12, 2024      If you require additional information please contact our office.        Sincerely,    Shakila Glass MD              
Refer to the Assessment tab to view/cancel completed assessment.

## 2024-11-19 ENCOUNTER — TELEPHONE (OUTPATIENT)
Dept: INTERNAL MEDICINE CLINIC | Facility: CLINIC | Age: 29
End: 2024-11-19

## 2024-11-19 DIAGNOSIS — F90.2 ATTENTION DEFICIT HYPERACTIVITY DISORDER (ADHD), COMBINED TYPE: ICD-10-CM

## 2024-11-19 RX ORDER — LISDEXAMFETAMINE DIMESYLATE 40 MG/1
40 CAPSULE ORAL EVERY MORNING
Qty: 30 CAPSULE | Refills: 0 | Status: CANCELLED | OUTPATIENT
Start: 2024-11-19

## 2024-11-19 RX ORDER — LISDEXAMFETAMINE DIMESYLATE 40 MG/1
40 CAPSULE ORAL DAILY
Qty: 30 CAPSULE | Refills: 0 | Status: SHIPPED | OUTPATIENT
Start: 2024-12-20 | End: 2025-01-19

## 2024-11-19 RX ORDER — LISDEXAMFETAMINE DIMESYLATE 40 MG/1
40 CAPSULE ORAL DAILY
Qty: 30 CAPSULE | Refills: 0 | Status: SHIPPED | OUTPATIENT
Start: 2024-11-19 | End: 2024-12-19

## 2024-11-19 RX ORDER — LISDEXAMFETAMINE DIMESYLATE 40 MG/1
40 CAPSULE ORAL DAILY
Qty: 30 CAPSULE | Refills: 0 | Status: SHIPPED | OUTPATIENT
Start: 2025-01-20 | End: 2025-02-19

## 2024-11-19 NOTE — TELEPHONE ENCOUNTER
To Dr. OSULLIVAN:   Please see pt's message below regarding dose increase:  Patient Comment: Per request of the doctor, writing a little note in regards to an update of the increased dosage. No adverse side effects to report, it was a little bit of an adjustment at the beginning, but current status their is a benefit to the dosage increase and I would like to continue at 40 MGs.         To MD:  The above refill request is for a controlled substance.  Please review pended medication order.   Print and sign for staff to fax to pharmacy or prescribe electronically.    Last office visit: 10/14/2024  Last time refill sent and quantity/refills:  Last ordered on 10/14/2024, #30/0   Per [place]  last dispensed on 10/14, #30/0

## 2025-02-14 ENCOUNTER — OFFICE VISIT (OUTPATIENT)
Dept: OTOLARYNGOLOGY | Facility: CLINIC | Age: 30
End: 2025-02-14
Payer: COMMERCIAL

## 2025-02-14 VITALS — WEIGHT: 160.81 LBS | BODY MASS INDEX: 23.02 KG/M2 | HEIGHT: 70 IN

## 2025-02-14 DIAGNOSIS — E07.89 THYROID PAIN: Primary | ICD-10-CM

## 2025-02-14 DIAGNOSIS — Z71.6 ENCOUNTER FOR TOBACCO USE CESSATION COUNSELING: ICD-10-CM

## 2025-02-14 PROCEDURE — 99213 OFFICE O/P EST LOW 20 MIN: CPT | Performed by: SPECIALIST

## 2025-02-14 PROCEDURE — 3008F BODY MASS INDEX DOCD: CPT | Performed by: SPECIALIST

## 2025-02-15 NOTE — PROGRESS NOTES
Shane Nails is a 29 year old male.   Chief Complaint   Patient presents with    Mouth/Lip Problem     Humble's apple feels strained at times and sometimes choke on it     HPI:   Patient here feels some irritation in his throat.  He has stopped smoking cigarettes, although does use vaping.    Current Outpatient Medications   Medication Sig Dispense Refill    lisdexamfetamine (VYVANSE) 40 MG Oral Cap Take 1 capsule (40 mg total) by mouth daily. 30 capsule 0    finasteride 5 MG Oral Tab daily as needed.      anastrozole 1 MG Oral Tab tab Take 1 tablet (1 mg total) by mouth twice a week.      testosterone cypionate 200 MG/ML Intramuscular Solution once a week.      ketoconazole 2 % External Cream Apply 2 % topically.        Past Medical History:    Lung nodules    Seasonal allergies      Social History:  Social History     Socioeconomic History    Marital status: Single   Tobacco Use    Smoking status: Never    Smokeless tobacco: Never   Vaping Use    Vaping status: Every Day    Start date: 1/1/2016    Substances: Nicotine, Flavoring    Devices: Disposable   Substance and Sexual Activity    Alcohol use: Yes     Alcohol/week: 1.0 standard drink of alcohol     Types: 1 Shots of liquor per week     Comment: Occasional    Drug use: Never     Social Drivers of Health      Received [facility], [facility]    Housing Stability        REVIEW OF SYSTEMS:   GENERAL HEALTH: feels well otherwise  GENERAL : denies fever, chills, sweats, weight loss, weight gain  SKIN: denies any unusual skin lesions or rashes  RESPIRATORY: denies shortness of breath with exertion  NEURO: denies headaches    EXAM:   Ht 5' 10\" (1.778 m)   Wt 160 lb 12.8 oz (72.9 kg)   BMI 23.07 kg/m²   System Details   Skin Inspection - Normal.   Constitutional Overall appearance - Normal.   Head/Face Facial features - Normal. Eyebrows - Normal. Skull - Normal.   Eyes Conjunctiva - Right: Normal, Left: Normal. Pupil -  Right: Normal, Left: Normal.    Ears Inspection - Right: Normal, Left: Normal.   Canal - Right: Normal, Left: Normal.    TM - Right: Normal, Left: Normal.   Nasal External nose - Normal.   Nasal septum - Normal.  Turbinates - Normal   Oral/Oropharynx Lips - Normal, Tonsils - Normal, Tongue - Normal    Neck Exam Inspection - Normal. Palpation - Normal. Parotid gland - Normal. Thyroid gland -some tenderness over the thyroid.  Thyroid feels slightly granular no obvious palpable nodules   Lymph Detail Submental. Submandibular. Anterior cervical. Posterior cervical. Supraclavicular.  All without enlargement   Psychiatric Orientation - Oriented to time, place, person & situation. Appropriate mood and affect.   Neurological Memory - Normal. Cranial nerves - Cranial nerves II through XII grossly intact.   Larynx Attempted due to mirror examination however very difficult to have a clear view of the laryngeal structures via this route.  Will consider repeat fiberoptic scope if symptoms are persistent.     ASSESSMENT AND PLAN:   1. Thyroid pain  Reviewed blood work done at [facility].  Patient is euthyroid and no evidence of Hashimoto's thyroiditis  - US THYROID (CPT=76536); Future    2. Encounter for tobacco use cessation counseling  Patient to try to stop vaping as this may also be causing some irritation.  Can consider a repeat laryngoscopy if symptoms are persistent.  - Smoking Cessation less than 3 minutes      The patient indicates understanding of these issues and agrees to the plan.      Shakila Glass MD  2/14/2025  9:35 PM

## 2025-02-15 NOTE — PATIENT INSTRUCTIONS
We reviewed your thyroid blood work including the blood work for Hashimoto's thyroiditis.  This was all negative.  You did have some tenderness over the area of your thyroid.  A thyroid ultrasound was ordered to better evaluate this.  I will of course notify you of all results.  We also discussed trying to stop the vaping as this may be irritating your throat as well.

## 2025-02-18 ENCOUNTER — HOSPITAL ENCOUNTER (OUTPATIENT)
Dept: ULTRASOUND IMAGING | Age: 30
Discharge: HOME | End: 2025-02-18
Attending: SPECIALIST
Payer: COMMERCIAL

## 2025-02-18 DIAGNOSIS — E07.89 THYROID PAIN: ICD-10-CM

## 2025-02-18 PROCEDURE — 76536 US EXAM OF HEAD AND NECK: CPT | Performed by: SPECIALIST

## 2025-02-20 ENCOUNTER — PATIENT MESSAGE (OUTPATIENT)
Dept: INTERNAL MEDICINE CLINIC | Facility: CLINIC | Age: 30
End: 2025-02-20

## 2025-02-20 DIAGNOSIS — K64.9 HEMORRHOIDS, UNSPECIFIED HEMORRHOID TYPE: Primary | ICD-10-CM

## 2025-02-20 RX ORDER — HYDROCORTISONE 25 MG/G
1 CREAM TOPICAL 2 TIMES DAILY
Qty: 28 G | Refills: 0 | Status: SHIPPED | OUTPATIENT
Start: 2025-02-20

## 2025-02-21 RX ORDER — CEPHALEXIN 500 MG/1
500 CAPSULE ORAL EVERY 8 HOURS
Qty: 30 CAPSULE | Refills: 0 | Status: SHIPPED | OUTPATIENT
Start: 2025-02-21

## 2025-03-15 ENCOUNTER — OFFICE VISIT (OUTPATIENT)
Dept: OTOLARYNGOLOGY | Facility: CLINIC | Age: 30
End: 2025-03-15

## 2025-03-15 DIAGNOSIS — J35.8 ASYMMETRIC TONSILS: Primary | ICD-10-CM

## 2025-03-15 DIAGNOSIS — E07.89 THYROID PAIN: ICD-10-CM

## 2025-03-15 DIAGNOSIS — K21.9 GASTROESOPHAGEAL REFLUX DISEASE, UNSPECIFIED WHETHER ESOPHAGITIS PRESENT: ICD-10-CM

## 2025-03-15 RX ORDER — OMEPRAZOLE 40 MG/1
40 CAPSULE, DELAYED RELEASE ORAL DAILY
Qty: 30 CAPSULE | Refills: 2 | Status: SHIPPED | OUTPATIENT
Start: 2025-03-15

## 2025-03-16 NOTE — PATIENT INSTRUCTIONS
There is some erythema of your arytenoids right greater than left suggestive of reflux disease.  You are given an antireflux diet and placed on a trial of omeprazole.  No greasy foods, spicy foods, chocolate, caffeine, alcohol, spearmint, peppermint, lemon, lime, orange, grapefruit, tomatoes.  Don't eat 2 hours before bedtime  Elevate the head of bed   A CT of the neck was ordered for your asymmetric tonsils right greater than left not improved by antibiotics.

## 2025-03-16 NOTE — PROGRESS NOTES
Shane Nails is a 29 year old male.   Chief Complaint   Patient presents with    Throat Problem     Pt in for f/u on throat, states not any better, feels pressure in throat.     HPI:   Patient here with persistent throat pain.  Patient is a current every day smoker.    Current Outpatient Medications   Medication Sig Dispense Refill    Omeprazole 40 MG Oral Capsule Delayed Release Take 1 capsule (40 mg total) by mouth daily. Before a meal 30 capsule 2    lisdexamfetamine (VYVANSE) 40 MG Oral Cap Take 1 capsule (40 mg total) by mouth daily. 30 capsule 0    [START ON 4/11/2025] lisdexamfetamine (VYVANSE) 40 MG Oral Cap Take 1 capsule (40 mg total) by mouth daily. 30 capsule 0    [START ON 5/12/2025] lisdexamfetamine (VYVANSE) 40 MG Oral Cap Take 1 capsule (40 mg total) by mouth daily. 30 capsule 0    cephALEXin 500 MG Oral Cap Take 1 capsule (500 mg total) by mouth every 8 (eight) hours. 30 capsule 0    hydrocortisone 2.5 % External Cream Place 1 Application rectally 2 (two) times daily. 28 g 0    anastrozole 1 MG Oral Tab tab Take 1 tablet (1 mg total) by mouth twice a week.      testosterone cypionate 200 MG/ML Intramuscular Solution once a week.        Past Medical History:    Lung nodules    Seasonal allergies      Social History:  Social History     Socioeconomic History    Marital status: Single   Tobacco Use    Smoking status: Never    Smokeless tobacco: Never   Vaping Use    Vaping status: Every Day    Start date: 1/1/2016    Substances: Nicotine, Flavoring    Devices: Disposable   Substance and Sexual Activity    Alcohol use: Yes     Alcohol/week: 1.0 standard drink of alcohol     Types: 1 Shots of liquor per week     Comment: Occasional    Drug use: Never     Social Drivers of Health      [facility]    Housing Stability        REVIEW OF SYSTEMS:   GENERAL HEALTH: feels well otherwise  GENERAL : denies fever, chills, sweats, weight loss,  weight gain  SKIN: denies any unusual skin lesions or rashes  RESPIRATORY: denies shortness of breath with exertion  NEURO: denies headaches    EXAM:   There were no vitals taken for this visit.  System Details   Skin Inspection - Normal.   Constitutional Overall appearance - Normal.   Head/Face Facial features - Normal. Eyebrows - Normal. Skull - Normal.   Eyes Conjunctiva - Right: Normal, Left: Normal. Pupil - Right: Normal, Left: Normal.    Ears Inspection - Right: Normal, Left: Normal.   Canal - Right: Normal, Left: Normal.    TM - Right: Normal, Left: Normal.   Nasal External nose - Normal.   Nasal septum - Normal.  Turbinates - Normal no sinusitis by fiberoptic exam   Oral/Oropharynx Lips - Normal, Tonsils -asymmetric tonsils right greater than left no other abnormalities noted of them.  Tongue - Normal    Neck Exam Inspection - Normal. Palpation - Normal. Parotid gland - Normal. Thyroid gland - Normal.   Lymph Detail Submental. Submandibular. Anterior cervical. Posterior cervical. Supraclavicular.  All without enlargement   Psychiatric Orientation - Oriented to time, place, person & situation. Appropriate mood and affect.   Neurological Memory - Normal. Cranial nerves - Cranial nerves II through XII grossly intact.   Nasopharynx Normal by fiberoptic exam   Larynx Consent was obtained for the procedure.  The nose was asesthetized with 1% neosynephrine and 4% lidocaine topical drops.    The scope was placed into the bilateral nares as well as the nasopharynx, hypopharynx and larynx.    All of which were examined.  The  entire larynx including the vallecula, epiglottis, true and false vocal cords, aryepiglottic folds, piriform sinuses and post cricord area were examined for tumors and infectious processes as well as for evidence of reflux and retained secretions.  Vocal cord mobility was also assessed.    Any abnormalities are noted in the exam section.  Posterior laryngeal erythema right greater than left.   Normal vocal cord mobility.  No tumors or masses seen.  No retained secretions.     ASSESSMENT AND PLAN:   1. Asymmetric tonsils  Your CT of the neck as patient has been on multiple rounds of antibiotic therapy and he has persistent pain as well as asymmetric tonsils.  - CT SOFT TISSUE OF NECK(CONTRAST ONLY) (CPT=70491); Future    2. Thyroid pain  Patient is a current every day smoker.    3. Gastroesophageal reflux disease, unspecified whether esophagitis present  Placed on omeprazole and given an antireflux diet.      The patient indicates understanding of these issues and agrees to the plan.      Shakila Glass MD  3/15/2025  8:26 PM

## 2025-03-28 ENCOUNTER — PATIENT MESSAGE (OUTPATIENT)
Dept: CASE MANAGEMENT | Age: 30
End: 2025-03-28

## 2025-04-12 NOTE — PATIENT INSTRUCTIONS
You are seen in clinic today for follow-up.  Today, we did review most recent set of blood work with slight elevation in your cholesterol levels    We did place fasting blood work to be completed at any time.    Will await the results of CT scan of the neck    - Lets target weight loss with good nutrition and exercise.    We will proceed with empiric management:  - Continue with the omeprazole as recommended in the meantime    Please complete the CT scan that was ordered on the last visit.  As long as the nodule is unchanged from 2022, this will likely complete the surveillance  - Lets continue with trying to cut down on the vaping as best we can    You should be able to schedule the CT scans together.  However the CT scan of the tach may take higher priority.  Follow-up with Dr. Glass    Continue the same medication for ADHD -Vyvanse 40 mg once a day.  We are happy her that this is helping  -We discussed the potential side effects of long-term stimulant use which include high blood pressure, chest pain, palpitations, unintended weight loss due to poor appetite, insomnia, increased anxiety.  We should do periodic check ins to make sure that it is still safe to continue stimulant medication.  The goal is to control symptoms while minimizing side effects with the lowest effective dose and the shortest duration of time.    Please monitor for any changes of anxiety if this is difficult to manage at home.    Return to clinic in 6 months for follow-up

## 2025-04-12 NOTE — H&P
Shane Ortez is a 29 year old male.    HPI:     Chief Complaint   Patient presents with    Physical     MrCici ORTEZ is a 29 year old male PMHX allergic rhinitis, history of low testosterone who presents today for annual physical examination    Concern for the the structures of the neck. Taking the omeprazole. Not much improvement with the omeprazole, prior Abx. When he moves his neck, it feels as if  the throat is trying to swallow itself.     He has been trying to cut down. He would have habitual use inbetween mali.. Pop, junk foods can be triggers.     Unsure if there is a trigger with stress and anxiety. More stress when not on the vyvnase. IT does seem to help. No major side effects.     Sleep can be variable. Sometimes 8 hours. Can have 5 hours of sleep.     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  - Hobbies: mali, skating   - Nutrition: tries to watch what he eats. Quick - tacqueria, burgers. Portion control. Grilled over fried. Does get salads, may harry to drink more fruits.  - Physical Activity: if he is not fried from work - walking, weight lifting. Has been holding off on this to avoid any potential flare-ups of the nekc      HISTORY:  Past Medical History:    Lung nodules    Seasonal allergies      Past Surgical History:   Procedure Laterality Date    Other      left shoulder srthroscopy      Family History   Problem Relation Age of Onset    Cancer Maternal Grandfather         lymphoma    Cancer Paternal Grandmother         lymphoma    Cancer Paternal Grandfather         prostate cancer    Other (aneurysm) Paternal Grandfather     Other (aneurysm) Paternal Cousin Female     Other (Other) Paternal Cousin Female 33      Social History:   Social History     Socioeconomic History    Marital status: Single   Tobacco Use    Smoking status:  Never    Smokeless tobacco: Never   Vaping Use    Vaping status: Every Day    Start date: 1/1/2016    Substances: Nicotine, Flavoring    Devices: Disposable   Substance and Sexual Activity    Alcohol use: Yes     Alcohol/week: 1.0 standard drink of alcohol     Types: 1 Shots of liquor per week     Comment: Occasional    Drug use: Never     Social Drivers of Health      Received from [facility]    Housing Stability        Medications (Active prior to today's visit):  Current Outpatient Medications   Medication Sig Dispense Refill    Omeprazole 40 MG Oral Capsule Delayed Release Take 1 capsule (40 mg total) by mouth daily. Before a meal 30 capsule 2    lisdexamfetamine (VYVANSE) 40 MG Oral Cap Take 1 capsule (40 mg total) by mouth daily. 30 capsule 0    anastrozole 1 MG Oral Tab tab Take 1 tablet (1 mg total) by mouth twice a week.      testosterone cypionate 200 MG/ML Intramuscular Solution once a week.      [START ON 5/12/2025] lisdexamfetamine (VYVANSE) 40 MG Oral Cap Take 1 capsule (40 mg total) by mouth daily. 30 capsule 0    cephALEXin 500 MG Oral Cap Take 1 capsule (500 mg total) by mouth every 8 (eight) hours. (Patient not taking: Reported on 4/14/2025) 30 capsule 0    hydrocortisone 2.5 % External Cream Place 1 Application rectally 2 (two) times daily. (Patient not taking: Reported on 4/14/2025) 28 g 0       Allergies:  Allergies   Allergen Reactions    Dust Mite Extract OTHER (SEE COMMENTS)     Other reaction(s): Congestion      Prednisone FATIGUE     Upset stomach   Other reaction(s): WEAKNESS  Upset stomach            ROS:   Positive Findings indicated in BOLD    Constitutional: Fever, Chills, Weight Gain, Weight Loss, Night Sweats, Fatigue, Malaise  ENT/Mouth:  Hearing Changes, Ear Pain, Nasal Congestion, Sinus Pain, Hoarseness, Sore throat, Rhinorrhea, Swallowing Difficulty  Eyes: Eye Pain, Swelling, Redness, Foreign Body, Discharge, Vision Changes  Cardiovascular: Chest Pain, SOB,  PND, Dyspnea on Exertion, Orthopnea, Claudication, Edema, Palpitations  Respiratory: Cough, Sputum, Wheezing, Shortness of breath  Gastrointestinal: Nausea, Vomiting, Diarrhea, Constipation, Pain, Heartburn, Dysphagia, Bloody stools, Tarry stools  Genitourinary: Dysmenorrhea, Dysuria, Urinary Frequency, Hematuria, Urinary Incontinence, Urgency,  Flank Pain  Musculoskeletal: Arthralgias, Myalgias, Joint Swelling, Joint Stiffness, Back Pain, Neck Pain  Integumentary: Skin Lesions, Pruritis, Hair Changes, Jaundice, Nail changes  Neuro: Weakness, Numbness, Paresthesias, Loss of Consciousness, Syncope, Dizziness, Headache, Falls  Psych: Anxiety, Depression, Insomnia, Suicidal Ideation, Homicidal ideation, Memory Changes, Concentration deficit  Heme/Lymph: Bruising, Bleeding, Lymphadenopathy  Endocrine: Polyuria, Polydipsia, Temperature Intolerance    PHYSICAL EXAM:   Vital Signs:  Blood pressure 118/76, pulse 96, temperature 97.9 °F (36.6 °C), height 5' 10\" (1.778 m), weight 155 lb 3.2 oz (70.4 kg), SpO2 97%.     Constitutional: No acute distress. Alert and oriented x 3.  Eyes: EOMI, PERRLA, clear sclera b/l  HENT: NCAT, Moist mucous membranes, Oropharynx without erythema or exudates  Neck: No JVD, no thyromegaly  Cardiovascular: S1, S2, no S3, no S4, Regular rate and rhythm, No murmurs/gallops/rubs.   Vascular: Equal pulses 2+ carotids no bruits or thrills/radial/DP/PT bilaterally  Respiratory: Clear to auscultation bilaterally.  No wheezes/rales/rhonchi  Gastrointestinal: Soft, nontender, nondistended. Positive bowel sounds x 4. No rebound tenderness. No hepatomegaly, No splenomegaly  Genitourinary: No CVA tenderness bilaterally  -No hernias noted bilaterally  - No skin rashes, ulceration in the groin area  Neurologic: No focal neurological deficits, CN II-XII intact, light touch intact, MSK Strength 5/5 and symmetric in all extremities, normal gait, 2+ patellar tendon  Musculoskeletal: Full range of motion of all  extremities, no clubbing/swelling/edema  Skin: + 3 isolated erythematous papules on the right elbow  Psychiatric: Appropriate mood and affect  Heme/Lymph/Immune: No cervical LAD      DATA REVIEWED   Labs:  No results found for this or any previous visit (from the past 8760 hours).    No results found for this or any previous visit (from the past 8760 hours).    Cholesterol, Total (mg/dL)   Date Value   04/09/2024 183     HDL Cholesterol (mg/dL)   Date Value   04/09/2024 41     LDL Cholesterol (mg/dL)   Date Value   04/09/2024 128 (H)     Triglycerides (mg/dL)   Date Value   04/09/2024 74       Last A1c value was 5.3% done 6/21/2022.        Imaging:  CT chest 3/29/2023    Impression   CONCLUSION:     Subcentimeter subpleural nodules within the right middle lobe were not imaged on 6/12/2022.     0.4 cm right lower lobe subpleural nodule is unchanged since 6/12/2022.     Followup chest CT recommended in 12 months to demonstrate resolution/stability.         2D echo 4/6/2023    Conclusions:     1. Left ventricle: The cavity size was normal. Wall thickness was normal.      Systolic function was normal. The estimated ejection fraction was 55%, by      visual assessment. No diagnostic evidence for regional wall motion      abnormalities. Left ventricular diastolic function parameters were normal      for the patient's age.   2. Right ventricle: The cavity size was normal. Systolic function was low      normal.   3. Left atrium: The left atrial volume was normal.   4. No significant valvular regurgitation or stenosis.   Impressions:  No previous study was available for comparison.   *     Ultrasound thyroid 2/18/2025    CONCLUSION:      Normal thyroid      Incidental lymph node in the left mid neck measures 1.1 x 0.2 x 1.6 centimeter     ASSESSMENT/PLAN:     Dysphagia  More so with some sensation of tightening right at his neck area.  No problems with swallowing however  - Did see ENT, concern for cobblestoning and dry  mucous membranes to the area  -Seen by Dr. Glass 3/15/2025 with plans for CT scan of the neck.  Advised to stop smoking.  - We should also consider GERD     We will proceed with empiric management:     - Trial of flonase 1 spray each nostril until symptoms improve  - Trial of antihistamine Zyrtec/Claritin/Allegra once a day until symptoms improve  -May need to try for 14 days to see if this helps.     For acid reflux potentially, we should try to find any food triggers  - Avoid any food exacerbating triggers  -May benefit from omeprazole/Prilosec 40 mg once a day for 2 weeks     If still no better, may need GI evaluation for possible endoscopy.  We will await the results of the CT scan for the neck in the meantime.     Lung nodule  Seen on CT scan 3/29, subcentimeter subpleural nodules within the right middle lobe not previously seen.  There is a 0.4 cm right lower lobe subpleural nodule unchanged in 6/2022  - Repeat CT scan is recommended, ordered today  - May be able to have the CT scans at the same day.  But the neck scan should have priority as above.     Hyperlipidemia  - Last   - Continue targeting weight loss, optimizing nutrition     History of left shoulder injury  Status post left Bankart repair and arthroscopy 11/2020 one of the left shoulder  - Seems to be stable  - Continue with home stretches and exercises for now     History of low testosterone  Receiving testosterone injections at a men's health clinic weekly.  Has had elevated testosterone levels previously   - Testosterone levels are on the lower end  - Plan to repeat testosterone levels through men's health clinic  - Should send us the results     Vitamin D deficiency  - Last vitamin D level 24  - Continue vitamin D supplementation  - Plan to repeat vitamin D at a future date     Seasonal allergies  - Trial of flonase 1 spray each nostril until symptoms improve  - Trial of antihistamine such as Zyrtec/Claritin/Allegra once a day      ADHD  Concern for completion of tasks, difficulty with executive functioning  - Previously on Vyvanse, did not tolerate Adderall  - We did increase Vyvanse 40 mg once a day.  Seems to be stable on the medication.  -We discussed the potential side effects of long-term stimulant use which include high blood pressure, chest pain, palpitations, unintended weight loss due to poor appetite, insomnia, increased anxiety.  We should do periodic check ins to make sure that it is still safe to continue stimulant medication.  The goal is to control symptoms while minimizing side effects with the lowest effective dose and the shortest duration of time.         Orders This Visit:  Orders Placed This Encounter   Procedures    CBC With Differential With Platelet    Comp Metabolic Panel (14)    Lipid Panel    TSH W Reflex To Free T4       Meds This Visit:  Requested Prescriptions      No prescriptions requested or ordered in this encounter       Imaging & Referrals:  CT CHEST (CPT=71250)       Health Maintenance  HTN Screen: BP at goal  DM Screen: Check fasting sugar  HLD Screen: Check fasting lipid panel  HCV Screen: Considered low risk  HIV Screen: -2024 on last check  G/C/Syphilis: HIV/chlamydia/hepatitis B and C screening -2024 on last check    Colon Cancer Screening (45-70): Not indicated  Prostate Cancer Screening: (50-70): Not indicated  Lung Cancer Screening (55-79 with 30 p/year and active < 15 years): Not indicated  AAA Screening (65-75 Hx of smoking): Not indicated    Influenza: Annually   Td/Tdap: Last Tdap - recommended  Zoster (50+): Not indicated  HPV (19-26): Not indicated  Pneumococcal: Not indicated    Immunization History   Administered Date(s) Administered    Covid-19 Vaccine Pfizer 30 mcg/0.3 ml 04/15/2021, 05/03/2021         Return to clinic in 6-12 months for follow-up    Bradley Jalloh MD, 04/14/25, 12:10 PM

## 2025-04-14 ENCOUNTER — OFFICE VISIT (OUTPATIENT)
Dept: INTERNAL MEDICINE CLINIC | Facility: CLINIC | Age: 30
End: 2025-04-14

## 2025-04-14 VITALS
SYSTOLIC BLOOD PRESSURE: 118 MMHG | DIASTOLIC BLOOD PRESSURE: 76 MMHG | HEIGHT: 70 IN | HEART RATE: 96 BPM | TEMPERATURE: 98 F | OXYGEN SATURATION: 97 % | BODY MASS INDEX: 22.22 KG/M2 | WEIGHT: 155.19 LBS

## 2025-04-14 DIAGNOSIS — Z13.29 SCREENING FOR THYROID DISORDER: ICD-10-CM

## 2025-04-14 DIAGNOSIS — J30.2 SEASONAL ALLERGIES: ICD-10-CM

## 2025-04-14 DIAGNOSIS — R91.1 PULMONARY NODULE: ICD-10-CM

## 2025-04-14 DIAGNOSIS — Z13.220 SCREENING FOR LIPOID DISORDERS: ICD-10-CM

## 2025-04-14 DIAGNOSIS — Z00.00 ANNUAL PHYSICAL EXAM: Primary | ICD-10-CM

## 2025-04-14 DIAGNOSIS — R79.89 LOW TESTOSTERONE IN MALE: ICD-10-CM

## 2025-04-14 DIAGNOSIS — E78.2 MIXED HYPERLIPIDEMIA: ICD-10-CM

## 2025-04-14 DIAGNOSIS — F90.0 ATTENTION DEFICIT HYPERACTIVITY DISORDER (ADHD), PREDOMINANTLY INATTENTIVE TYPE: ICD-10-CM

## 2025-04-14 DIAGNOSIS — E55.9 VITAMIN D DEFICIENCY: ICD-10-CM

## 2025-04-14 DIAGNOSIS — F90.2 ATTENTION DEFICIT HYPERACTIVITY DISORDER (ADHD), COMBINED TYPE: ICD-10-CM

## 2025-04-14 DIAGNOSIS — Z13.0 SCREENING FOR DEFICIENCY ANEMIA: ICD-10-CM

## 2025-04-18 ENCOUNTER — PATIENT MESSAGE (OUTPATIENT)
Dept: OTOLARYNGOLOGY | Facility: CLINIC | Age: 30
End: 2025-04-18

## 2025-04-18 ENCOUNTER — OFFICE VISIT (OUTPATIENT)
Dept: OTOLARYNGOLOGY | Facility: CLINIC | Age: 30
End: 2025-04-18

## 2025-04-18 DIAGNOSIS — J35.8 ASYMMETRIC TONSILS: Primary | ICD-10-CM

## 2025-04-18 DIAGNOSIS — E07.89 THYROID PAIN: ICD-10-CM

## 2025-04-18 PROCEDURE — 99213 OFFICE O/P EST LOW 20 MIN: CPT | Performed by: SPECIALIST

## 2025-04-19 NOTE — PROGRESS NOTES
Shane Nails is a 29 year old male.   Chief Complaint   Patient presents with    Follow - Up     Reevaluation on asymmetric tonsils, reports no improvements      HPI:   Patient here for persistent throat pain.  Has been on omeprazole and an antireflux diet without improvement of his symptoms of any significance.    Current Medications[1]   Past Medical History[2]   Social History:  Short Social Hx on File[3]     REVIEW OF SYSTEMS:   GENERAL HEALTH: feels well otherwise  GENERAL : denies fever, chills, sweats, weight loss, weight gain  SKIN: denies any unusual skin lesions or rashes  RESPIRATORY: denies shortness of breath with exertion  NEURO: denies headaches    EXAM:   There were no vitals taken for this visit.  System Details   Skin Inspection - Normal.   Constitutional Overall appearance - Normal.   Head/Face Facial features - Normal. Eyebrows - Normal. Skull - Normal.   Eyes Conjunctiva - Right: Normal, Left: Normal. Pupil - Right: Normal, Left: Normal.    Ears Inspection - Right: Normal, Left: Normal.   Canal - Right: Normal, Left: Normal.    TM - Right: Normal, Left: Normal.   Nasal External nose - Normal.   Nasal septum - Normal.  Turbinates - Normal   Oral/Oropharynx Lips - Normal, Tonsils -asymmetric tonsils right larger than left tongue - Normal    Neck Exam Inspection - Normal. Palpation - Normal. Parotid gland - Normal. Thyroid gland - Normal.   Lymph Detail Submental. Submandibular. Anterior cervical. Posterior cervical. Supraclavicular.  All without enlargement   Psychiatric Orientation - Oriented to time, place, person & situation. Appropriate mood and affect.   Neurological Memory - Normal. Cranial nerves - Cranial nerves II through XII grossly intact.   Larynx Mirror examination with slight retained secretions in the piriform sinuses.  No tumors or masses seen.  Normal vocal cord mobility.     ASSESSMENT AND PLAN:   1. Asymmetric tonsils  Right greater than left.  Would recommend CT scan of the  neck to better evaluate.    2. Thyroid pain  Patient is a current every day smoker.  Negative laryngeal examination.  No improvement after reflux medication.  Would recommend CT scan of the neck to better evaluate.      The patient indicates understanding of these issues and agrees to the plan.      Shakila Glass MD  4/18/2025  10:48 PM       [1]   Current Outpatient Medications   Medication Sig Dispense Refill    Omeprazole 40 MG Oral Capsule Delayed Release Take 1 capsule (40 mg total) by mouth daily. Before a meal 30 capsule 2    lisdexamfetamine (VYVANSE) 40 MG Oral Cap Take 1 capsule (40 mg total) by mouth daily. 30 capsule 0    [START ON 5/12/2025] lisdexamfetamine (VYVANSE) 40 MG Oral Cap Take 1 capsule (40 mg total) by mouth daily. 30 capsule 0    anastrozole 1 MG Oral Tab tab Take 1 tablet (1 mg total) by mouth twice a week.      testosterone cypionate 200 MG/ML Intramuscular Solution once a week.      cephALEXin 500 MG Oral Cap Take 1 capsule (500 mg total) by mouth every 8 (eight) hours. (Patient not taking: Reported on 4/18/2025) 30 capsule 0    hydrocortisone 2.5 % External Cream Place 1 Application rectally 2 (two) times daily. (Patient not taking: Reported on 4/18/2025) 28 g 0   [2]   Past Medical History:   Lung nodules    Seasonal allergies   [3]   Social History  Socioeconomic History    Marital status: Single   Tobacco Use    Smoking status: Never    Smokeless tobacco: Never   Vaping Use    Vaping status: Every Day    Start date: 1/1/2016    Substances: Nicotine, Flavoring    Devices: Disposable   Substance and Sexual Activity    Alcohol use: Yes     Alcohol/week: 1.0 standard drink of alcohol     Types: 1 Shots of liquor per week     Comment: Occasional    Drug use: Never     Social Drivers of Health      Received from [facility]    Housing Stability

## 2025-04-19 NOTE — PATIENT INSTRUCTIONS
No improvement of symptoms on the omeprazole and antireflux diet.  I still would recommend the CT scan to better evaluate the asymmetric tonsils.  Please call and try to find the status of your certification.  I can always do an appeal for reconsideration if necessary.

## 2025-04-22 ENCOUNTER — PATIENT MESSAGE (OUTPATIENT)
Dept: CASE MANAGEMENT | Age: 30
End: 2025-04-22

## 2025-04-22 ENCOUNTER — TELEPHONE (OUTPATIENT)
Dept: CASE MANAGEMENT | Age: 30
End: 2025-04-22

## 2025-04-22 NOTE — TELEPHONE ENCOUNTER
CT Soft Tissue Neck        Status: DENIED        Reference number [id number]     A copy of the denial letter is filed under the MEDIA tab, reference for complete details. You may reach out to Zhao at [phone] to discuss decision.     Please reach out to patient with plan of care.      Thank you

## 2025-04-22 NOTE — TELEPHONE ENCOUNTER
Contacted Dante at [facility] and per Dante patient will need a peer to peer. Contacted Dr. Glass and she is able to speak to md for peer to peer now.  Updated Dante and Dr. Glass contacted, transferred to Insurance MD at [facility] for peer to peer.  Per Dr. Glass, CT scan of neck is approved, auth ##is [id number].  Patient contacted that is approved until may 25th, and should go ahead and schedule test. Verbalized understanding.    Dr. Glass, EDEL thank you!

## 2025-04-30 ENCOUNTER — HOSPITAL ENCOUNTER (OUTPATIENT)
Dept: CT IMAGING | Facility: HOSPITAL | Age: 30
Discharge: HOME | End: 2025-04-30
Attending: SPECIALIST
Payer: COMMERCIAL

## 2025-04-30 DIAGNOSIS — J35.8 ASYMMETRIC TONSILS: ICD-10-CM

## 2025-04-30 PROCEDURE — 70491 CT SOFT TISSUE NECK W/DYE: CPT | Performed by: SPECIALIST

## 2025-05-06 LAB
CREAT BLD-MCNC: 1 MG/DL (ref 0.7–1.3)
EGFRCR SERPLBLD CKD-EPI 2021: 103 ML/MIN/1.73M2 (ref 60–?)

## (undated) DEVICE — Device

## (undated) DEVICE — LAWSON - SPONGE GAUZE 4X4 12PLY STL 10S

## (undated) NOTE — LETTER
1/12/2024          To Whom It May Concern:    Shane Nails is currently under my medical care.  Please excuse Shane for Monday January 8,2024-Monday January 15, 2024.  He may return to work on Tuesday January 16, 2024 without restrictions.    If you require additional information please contact our office.        Sincerely,    Shakila Glass MD

## (undated) NOTE — LETTER
5/30/2023          To Whom It May Concern:    Enrico Love is currently under my medical care. He may return to work on Monday June 5, 2023. If you require additional information please contact our office.         Sincerely,    Alba Graf MD

## (undated) NOTE — MR AVS SNAPSHOT
After Visit Summary   6/15/2021    Vikash Crocuh    MRN: [id number]           Visit Information     Date & Time  6/15/2021  4:40 PM Provider  Donato Merchant MD [address] [place] [address], [address] [place] [address],[address], [facility]/[employer].  Phone  [phone] date, you must request a new code. [employer] Activation Code: [email or web address]  Expires: 7/30/2021  4:43 PM    4. Enter your Zip Code and Date of Birth (mm/dd/yyyy) as indicated and click Next. You will be taken to the next sign-up page.   5. Create a [employer] [facility]ician or MATTHIAS online. The physician will respond and provide   a treatment plan within a few hours.  ONLINE VISIT  Primary Care Providers  Treatment for mild illness or injury that does not require immediate attention VIDEO VISITS  Average cost  $35*